# Patient Record
Sex: MALE | Race: BLACK OR AFRICAN AMERICAN | NOT HISPANIC OR LATINO | URBAN - METROPOLITAN AREA
[De-identification: names, ages, dates, MRNs, and addresses within clinical notes are randomized per-mention and may not be internally consistent; named-entity substitution may affect disease eponyms.]

---

## 2017-05-13 ENCOUNTER — EMERGENCY (EMERGENCY)
Facility: HOSPITAL | Age: 37
LOS: 1 days | Discharge: HOME | End: 2017-05-13
Attending: EMERGENCY MEDICINE
Payer: MEDICAID

## 2017-05-13 VITALS
DIASTOLIC BLOOD PRESSURE: 69 MMHG | RESPIRATION RATE: 19 BRPM | HEIGHT: 67 IN | WEIGHT: 149.91 LBS | TEMPERATURE: 99 F | HEART RATE: 92 BPM | SYSTOLIC BLOOD PRESSURE: 138 MMHG

## 2017-05-13 DIAGNOSIS — F10.129 ALCOHOL ABUSE WITH INTOXICATION, UNSPECIFIED: ICD-10-CM

## 2017-05-13 PROCEDURE — 99285 EMERGENCY DEPT VISIT HI MDM: CPT | Mod: 25

## 2017-05-13 NOTE — ED PROVIDER NOTE - CONSTITUTIONAL, MLM
normal... Well appearing, well nourished, awake, alert, oriented to person, place, time/situation and in no apparent distress. ETOH on breath.

## 2017-05-13 NOTE — ED ADULT NURSE NOTE - OBJECTIVE STATEMENT
Patient brought in by ambulance, found in bus intoxicated. Patient very agitated and verbally abusive to staff.

## 2017-05-13 NOTE — ED PROVIDER NOTE - OBJECTIVE STATEMENT
36 year old male was brought to the ED after drinking alcohol. He admits to drinking alcohol and is upset that his phone was lost. He is cursing about his lost phone. He denies suicidal or homicidal ideation. He denies any trauma. He does not want to be here and is asking to leave and demanding his phone.

## 2017-08-18 ENCOUNTER — EMERGENCY (EMERGENCY)
Facility: HOSPITAL | Age: 37
LOS: 1 days | Discharge: HOME | End: 2017-08-18
Attending: EMERGENCY MEDICINE
Payer: MEDICAID

## 2017-08-18 VITALS
HEART RATE: 88 BPM | TEMPERATURE: 98 F | OXYGEN SATURATION: 99 % | SYSTOLIC BLOOD PRESSURE: 137 MMHG | RESPIRATION RATE: 18 BRPM | DIASTOLIC BLOOD PRESSURE: 85 MMHG

## 2017-08-18 VITALS
OXYGEN SATURATION: 98 % | WEIGHT: 162.92 LBS | TEMPERATURE: 99 F | RESPIRATION RATE: 16 BRPM | SYSTOLIC BLOOD PRESSURE: 118 MMHG | HEART RATE: 117 BPM | DIASTOLIC BLOOD PRESSURE: 64 MMHG

## 2017-08-18 DIAGNOSIS — R00.2 PALPITATIONS: ICD-10-CM

## 2017-08-18 DIAGNOSIS — F17.210 NICOTINE DEPENDENCE, CIGARETTES, UNCOMPLICATED: ICD-10-CM

## 2017-08-18 LAB
ALBUMIN SERPL ELPH-MCNC: 4 G/DL — SIGNIFICANT CHANGE UP (ref 3.5–5)
ALP SERPL-CCNC: 85 U/L — SIGNIFICANT CHANGE UP (ref 40–120)
ALT FLD-CCNC: 46 U/L DA — SIGNIFICANT CHANGE UP (ref 10–60)
ANION GAP SERPL CALC-SCNC: 10 MMOL/L — SIGNIFICANT CHANGE UP (ref 5–17)
AST SERPL-CCNC: 44 U/L — HIGH (ref 10–40)
BASOPHILS # BLD AUTO: 0.1 K/UL — SIGNIFICANT CHANGE UP (ref 0–0.2)
BASOPHILS NFR BLD AUTO: 1.1 % — SIGNIFICANT CHANGE UP (ref 0–2)
BILIRUB SERPL-MCNC: 0.2 MG/DL — SIGNIFICANT CHANGE UP (ref 0.2–1.2)
BUN SERPL-MCNC: 21 MG/DL — HIGH (ref 7–18)
CALCIUM SERPL-MCNC: 8.9 MG/DL — SIGNIFICANT CHANGE UP (ref 8.4–10.5)
CHLORIDE SERPL-SCNC: 108 MMOL/L — SIGNIFICANT CHANGE UP (ref 96–108)
CO2 SERPL-SCNC: 25 MMOL/L — SIGNIFICANT CHANGE UP (ref 22–31)
CREAT SERPL-MCNC: 1.27 MG/DL — SIGNIFICANT CHANGE UP (ref 0.5–1.3)
EOSINOPHIL # BLD AUTO: 0 K/UL — SIGNIFICANT CHANGE UP (ref 0–0.5)
EOSINOPHIL NFR BLD AUTO: 0.2 % — SIGNIFICANT CHANGE UP (ref 0–6)
GLUCOSE SERPL-MCNC: 107 MG/DL — HIGH (ref 70–99)
HCT VFR BLD CALC: 44.1 % — SIGNIFICANT CHANGE UP (ref 39–50)
HGB BLD-MCNC: 14.3 G/DL — SIGNIFICANT CHANGE UP (ref 13–17)
LYMPHOCYTES # BLD AUTO: 2.1 K/UL — SIGNIFICANT CHANGE UP (ref 1–3.3)
LYMPHOCYTES # BLD AUTO: 20.3 % — SIGNIFICANT CHANGE UP (ref 13–44)
MCHC RBC-ENTMCNC: 32.3 GM/DL — SIGNIFICANT CHANGE UP (ref 32–36)
MCHC RBC-ENTMCNC: 33.8 PG — SIGNIFICANT CHANGE UP (ref 27–34)
MCV RBC AUTO: 104.8 FL — HIGH (ref 80–100)
MONOCYTES # BLD AUTO: 0.8 K/UL — SIGNIFICANT CHANGE UP (ref 0–0.9)
MONOCYTES NFR BLD AUTO: 7.4 % — SIGNIFICANT CHANGE UP (ref 2–14)
NEUTROPHILS # BLD AUTO: 7.2 K/UL — SIGNIFICANT CHANGE UP (ref 1.8–7.4)
NEUTROPHILS NFR BLD AUTO: 71 % — SIGNIFICANT CHANGE UP (ref 43–77)
PLATELET # BLD AUTO: 151 K/UL — SIGNIFICANT CHANGE UP (ref 150–400)
POTASSIUM SERPL-MCNC: 4 MMOL/L — SIGNIFICANT CHANGE UP (ref 3.5–5.3)
POTASSIUM SERPL-SCNC: 4 MMOL/L — SIGNIFICANT CHANGE UP (ref 3.5–5.3)
PROT SERPL-MCNC: 8 G/DL — SIGNIFICANT CHANGE UP (ref 6–8.3)
RBC # BLD: 4.21 M/UL — SIGNIFICANT CHANGE UP (ref 4.2–5.8)
RBC # FLD: 12.1 % — SIGNIFICANT CHANGE UP (ref 10.3–14.5)
SODIUM SERPL-SCNC: 143 MMOL/L — SIGNIFICANT CHANGE UP (ref 135–145)
TROPONIN I SERPL-MCNC: <0.015 NG/ML — SIGNIFICANT CHANGE UP (ref 0–0.04)
WBC # BLD: 10.2 K/UL — SIGNIFICANT CHANGE UP (ref 3.8–10.5)
WBC # FLD AUTO: 10.2 K/UL — SIGNIFICANT CHANGE UP (ref 3.8–10.5)

## 2017-08-18 PROCEDURE — 84484 ASSAY OF TROPONIN QUANT: CPT

## 2017-08-18 PROCEDURE — 71046 X-RAY EXAM CHEST 2 VIEWS: CPT

## 2017-08-18 PROCEDURE — 99283 EMERGENCY DEPT VISIT LOW MDM: CPT | Mod: 25

## 2017-08-18 PROCEDURE — 71020: CPT | Mod: 26

## 2017-08-18 PROCEDURE — 93005 ELECTROCARDIOGRAM TRACING: CPT

## 2017-08-18 PROCEDURE — 85027 COMPLETE CBC AUTOMATED: CPT

## 2017-08-18 PROCEDURE — 80053 COMPREHEN METABOLIC PANEL: CPT

## 2017-08-18 PROCEDURE — 36000 PLACE NEEDLE IN VEIN: CPT

## 2017-08-18 PROCEDURE — 99285 EMERGENCY DEPT VISIT HI MDM: CPT | Mod: 25

## 2017-08-18 RX ORDER — SODIUM CHLORIDE 9 MG/ML
3 INJECTION INTRAMUSCULAR; INTRAVENOUS; SUBCUTANEOUS ONCE
Qty: 0 | Refills: 0 | Status: COMPLETED | OUTPATIENT
Start: 2017-08-18 | End: 2017-08-18

## 2017-08-18 RX ADMIN — SODIUM CHLORIDE 3 MILLILITER(S): 9 INJECTION INTRAMUSCULAR; INTRAVENOUS; SUBCUTANEOUS at 04:58

## 2017-08-18 NOTE — ED PROVIDER NOTE - OBJECTIVE STATEMENT
37yo M 35yo M presents with palpitations. Onset at 11pm, reports minimal chest pain, denies dyspnea, leg swelling. Denies recent travel, fever. Reports mild nonproductive cough.

## 2019-01-06 ENCOUNTER — HOSPITAL ENCOUNTER (INPATIENT)
Dept: HOSPITAL 74 - YASAS | Age: 39
LOS: 1 days | Discharge: LEFT BEFORE DISCHARGE | DRG: 770 | End: 2019-01-07
Attending: INTERNAL MEDICINE | Admitting: INTERNAL MEDICINE
Payer: COMMERCIAL

## 2019-01-06 VITALS — BODY MASS INDEX: 20.2 KG/M2

## 2019-01-06 DIAGNOSIS — R55: ICD-10-CM

## 2019-01-06 DIAGNOSIS — F10.230: Primary | ICD-10-CM

## 2019-01-06 DIAGNOSIS — F17.210: ICD-10-CM

## 2019-01-06 DIAGNOSIS — F14.20: ICD-10-CM

## 2019-01-06 DIAGNOSIS — Z86.79: ICD-10-CM

## 2019-01-06 DIAGNOSIS — F12.20: ICD-10-CM

## 2019-01-06 PROCEDURE — HZ2ZZZZ DETOXIFICATION SERVICES FOR SUBSTANCE ABUSE TREATMENT: ICD-10-PCS | Performed by: INTERNAL MEDICINE

## 2019-01-06 NOTE — HP
CIWA Score


Nausea/Vomitin


Muscle Tremors: 2


Anxiety: 2


Agitation: 2


Paroxysmal Sweats: 1-Minimal Palms Moist


Orientation: 0-Oriented


Tacttile Disturbances: 1-Very Mild Itch/Numbness


Auditory Disturbances: 1-Very Mild


Visual Disturbances: 0-None


Headache: 2-Mild


CIWA-Ar Total Score: 13





- Admission Criteria


[facility] Guidelines: Admission for Medically Managed Detox: 


Requires at least one of the followin. CIWA greater than 12


2. Seizures within the past 24 hours


3. Delirium tremens within the past 24 hours


4. Hallucinations within the past 24 hours


5. Acute intervention needed for co  occurring medical disorder


6. Acute intervention needed for co  occurring psychiatric disorder


7. Severe withdrawal that cannot be handled at a lower level of care (continued


    vomiting, continued diarrhea, abnormal vital signs) requiring intravenous


    medication and/or fluids


8. Pregnancy





Patient presents the following: CIWA greater than 12


Admission Criteria Met: Admission criteria met





Admission ROS S





- [facility]


Chief Complaint: 





i need help to stop drinking alcohol,marijuana,cocaine dependence


Allergies/Adverse Reactions: 


 Allergies











Allergy/AdvReac Type Severity Reaction Status Date / Time


 


No Known Allergies Allergy   Verified 19 18:48











History of Present Illness: 





this 38 years old male with alcohol and marijuana dependence,cocaine dependence,

seeking detox,never been in detox before


syncope


nicotine dependence


no significant period of sobriety


history of wpw,irregularity of heart beat,since  ,supposed to take asa 325 

mgs po daily,non compliance,had cardiologist in [place]


Exam Limitations: No Limitations





- Ebola screening


Have you traveled outside of the country in the last 21 days: No (N)


Have you had contact with anyone from an Ebola affected area: No


Have you been sick,other than usual withdrawal symptoms: No


Do you have a fever: No





- Review of Systems


Constitutional: Loss of Appetite, Malaise, Night Sweats, Changes in sleep, 

Weakness


EENT: reports: Nose Congestion


Respiratory: reports: No Symptoms reported


Cardiac: reports: No Symptoms Reported


GI: reports: Nausea, Poor Appetite, Abdominal cramping


: reports: No Symptoms Reported


Musculoskeletal: reports: Back Pain, Muscle Pain


Integumentary: reports: Dryness


Neuro: reports: Headache, Tremors


Endocrine: reports: No Symptoms Reported


Hematology: reports: No Symptoms Reported


Psychiatric: reports: No Sypmtoms Reported, Judgement Intact, Mood/Affect 

Appropiate, Orientated x3


Other Systems: Reviewed and Negative





Patient History





- Patient Medical History


Hx Anemia: No


Hx Asthma: No


Hx Chronic Obstructive Pulmonary Disease (COPD): No


Hx Cancer: No


Hx Cardiac Disorders: No


Hx Congestive Heart Failure: No


Hx Hypertension: No


Hx Hypercholesterolemia: No


Hx Pacemaker: No


HX Cerebrovascular Accident: No


Hx Seizures: No


Hx Dementia: No


Hx Diabetes: No


Hx Gastrointestinal Disorders: No


Hx Sexually Transmitted Disorders: No


Hx Renal Disease (ESRD): No


Hx Thyroid Disease: No


Hx Human Immunodeficiency Virus (HIV): No (last hiv test  negative)


Hx Hepatitis C: No


Hx Depression: No


Hx Suicide Attempt: No


Hx Bipolar Disorder: No


Hx Schizophrenia: No


Other Medical History: no suicidal,no homicidal





- Patient Surgical History


Past Surgical History: No





- PPD History


Previous Implant?: Yes


Documented Results: Negative w/o proof


Implanted On Prior [facility] Admission?: No


PPD to be Administered?: Yes





- Smoking Cessation


Smoking history: Current every day smoker


Have you smoked in the past 12 months: Yes


Aproximately how many cigarettes per day: 5


Cigars Per Day: 0


Hx Chewing Tobacco Use: No


Initiated information on smoking cessation: Yes


'Breaking Loose' booklet given: 19





- Substance & Tx. History


Hx Alcohol Use: Yes


Hx Substance Use: Yes


Substance Use Type: Alcohol, Cocaine, Marijuana


Hx Substance Use Treatment: No





- Substances Abused


  ** Alcohol


Route: Oral


Frequency: Daily


Amount used: LIQUOR- 4 PINTS


Age of first use: 36


Date of Last Use: 19





  ** Marijuana/Hashish


Route: Smoking


Frequency: Daily


Amount used: 7 JOINTS


Age of first use: 19


Date of Last Use: 19





  ** Cocaine


Route: Inhalation


Frequency: 1-3 times last 30 days


Amount used: 20$


Age of first use: 38


Date of Last Use: 19





Family Disease History





- Family Disease History


Family History: Denies





Admission Physical Exam [facility]





- Vital Signs


Vital Signs: 


 Vital Signs - 24 hr











  19





  17:35


 


Temperature 98.1 F


 


Pulse Rate 86


 


Respiratory 18





Rate 


 


Blood Pressure 143/86














- Physical


General Appearance: Yes: Moderate Distress, Tremorous, Irritable, Sweating, 

Anxious


HEENTM: Yes: Normal ENT Inspection, SIS, Pharynx Normal


Respiratory: Yes: Lungs Clear, Normal Breath Sounds, No Respiratory Distress


Neck: Yes: Within Normal Limits, Supple, Trachea in good position


Breast: Yes: Within Normal Limits


Cardiology: Yes: Within Normal Limits, Regular Rhythm, Regular Rate, S1, S2


Abdominal: Yes: Within Normal Limits, Normal Bowel Sounds, Non Tender, Flat, 

Soft


Genitourinary: Yes: Within Normal Limits


Back: Yes: Within Normal Limits, Normal Inspection, Muscle Spasm


Musculoskeletal: Yes: Back pain, Muscle Pain


Extremities: Yes: Within Normal Limits, Tremors


Neurological: Yes: CNs II-XII NML intact, Fully Oriented, Alert, Motor Strength 

5/5


Integumentary: Yes: Dry


Lymphatic: Yes: Within Normal Limits





- Diagnostic


(1) Alcohol dependence with uncomplicated withdrawal


Current Visit: Yes   Status: Acute   





(2) Cannabis dependence


Current Visit: Yes   Status: Acute   





(3) Cocaine abuse


Current Visit: Yes   Status: Acute   





(4) Syncope


Current Visit: Yes   Status: Acute   





(5) Nicotine dependence


Current Visit: Yes   Status: Acute   





(6) History of Francesco-Parkinson-White (WPW) syndrome


Current Visit: Yes   Status: Acute   





Cleared for Admission [facility]





- Detox or Rehab


[facility] Level of Care: Medically Managed


Detox Regimen/Protocol: Librium





[facility] Breath Alcohol Content


Breath Alcohol Content: 0.240





Urine Drug Screen





- Results


Drug Screen Negative: No


Urine Drug Screen Results: THC-Marijuana, TARUN-Cocaine

## 2019-01-07 VITALS — HEART RATE: 77 BPM

## 2019-01-07 VITALS — DIASTOLIC BLOOD PRESSURE: 64 MMHG | SYSTOLIC BLOOD PRESSURE: 151 MMHG | TEMPERATURE: 98.1 F

## 2019-01-07 LAB
ALBUMIN SERPL-MCNC: 3.6 G/DL (ref 3.4–5)
ALP SERPL-CCNC: 90 U/L (ref 45–117)
ALT SERPL-CCNC: 31 U/L (ref 13–61)
ANION GAP SERPL CALC-SCNC: 9 MMOL/L (ref 8–16)
AST SERPL-CCNC: 47 U/L (ref 15–37)
BILIRUB SERPL-MCNC: 1.1 MG/DL (ref 0.2–1)
BUN SERPL-MCNC: 13 MG/DL (ref 7–18)
CALCIUM SERPL-MCNC: 8.6 MG/DL (ref 8.5–10.1)
CHLORIDE SERPL-SCNC: 100 MMOL/L (ref 98–107)
CO2 SERPL-SCNC: 29 MMOL/L (ref 21–32)
CREAT SERPL-MCNC: 1.2 MG/DL (ref 0.55–1.3)
DEPRECATED RDW RBC AUTO: 13.1 % (ref 11.9–15.9)
GLUCOSE SERPL-MCNC: 123 MG/DL (ref 74–106)
HCT VFR BLD CALC: 37.6 % (ref 35.4–49)
HGB BLD-MCNC: 13.3 GM/DL (ref 11.7–16.9)
MCH RBC QN AUTO: 36.1 PG (ref 25.7–33.7)
MCHC RBC AUTO-ENTMCNC: 35.5 G/DL (ref 32–35.9)
MCV RBC: 101.7 FL (ref 80–96)
PLATELET # BLD AUTO: 159 K/MM3 (ref 134–434)
PMV BLD: 8.3 FL (ref 7.5–11.1)
POTASSIUM SERPLBLD-SCNC: 3.8 MMOL/L (ref 3.5–5.1)
PROT SERPL-MCNC: 7 G/DL (ref 6.4–8.2)
RBC # BLD AUTO: 3.69 M/MM3 (ref 4–5.6)
SODIUM SERPL-SCNC: 137 MMOL/L (ref 136–145)
WBC # BLD AUTO: 4.5 K/MM3 (ref 4–10)

## 2019-01-07 NOTE — DS
[facility] Detox Discharge Summary


Admission Date: 


01/06/19





Discharge Date: 01/07/19





- History


Present History: Alcohol Dependence, Cannabis Dependence, Cocaine Dependence


Additional Comments: 





PATIENT DOES NOT WISH TO REMAIN TO COMPLETE DETOX REGIMEN. RISKS OF LEAVING 

DETOX UNIT AGAINST MEDICAL ADVICE AND PRIOR TO COMPLETION OF DETOX REGIMEN 

EXPLAINED TO PATIENT. PATIENT ADVISED TO GO IMMEDIATELY TO NEAREST ER SHOULD 

ANY INTOLERABLE DETOX SYMPTOMS DEVELOP AT ANY TIME. PATIENT VERBALIZED 

UNDERSTANDING OF ALL INFORMATION / RECOMMENDATIONS PRESENTED TO HIM PRIOR TO 

DEPARTURE FROM DETOX UNIT. PATIENT LEFT DETOX UNIT IN STABLE MEDICAL CONDITION.


Pertinent Past History: 





History of Francesco-Parkinson-White (WPW) Syndrome, Nicotine Dependence, History 

of Syncope.





- Physical Exam Results


Vital Signs: 


 Vital Signs











Temperature  98.1 F   01/07/19 10:21


 


Pulse Rate  77   01/07/19 10:21


 


Respiratory Rate  18   01/07/19 10:21


 


Blood Pressure  151/64   01/07/19 10:21


 


O2 Sat by Pulse Oximetry (%)      











Pertinent Admission Physical Exam Findings: 





WITHDRAWAL SYMPTOMS.





 Laboratory Tests











  01/07/19 01/07/19 01/07/19





  07:00 07:00 07:00


 


WBC   4.5 


 


RBC   3.69 L 


 


Hgb   13.3 


 


Hct   37.6 


 


MCV   101.7 H 


 


MCH   36.1 H 


 


MCHC   35.5 


 


RDW   13.1 


 


Plt Count   159 


 


MPV   8.3 


 


Sodium    137


 


Potassium    3.8


 


Chloride    100


 


Carbon Dioxide    29


 


Anion Gap    9


 


BUN    13


 


Creatinine    1.2


 


Creat Clearance w eGFR    > 60


 


Random Glucose    123 H


 


Calcium    8.6


 


Total Bilirubin    1.1 H


 


AST    47 H


 


ALT    31


 


Alkaline Phosphatase    90


 


Total Protein    7.0


 


Albumin    3.6


 


RPR Titer   


 


HIV 1&2 Antibody Screen  Negative  


 


HIV P24 Antigen  Negative  














  01/07/19





  07:00


 


WBC 


 


RBC 


 


Hgb 


 


Hct 


 


MCV 


 


MCH 


 


MCHC 


 


RDW 


 


Plt Count 


 


MPV 


 


Sodium 


 


Potassium 


 


Chloride 


 


Carbon Dioxide 


 


Anion Gap 


 


BUN 


 


Creatinine 


 


Creat Clearance w eGFR 


 


Random Glucose 


 


Calcium 


 


Total Bilirubin 


 


AST 


 


ALT 


 


Alkaline Phosphatase 


 


Total Protein 


 


Albumin 


 


RPR Titer  Nonreactive


 


HIV 1&2 Antibody Screen 


 


HIV P24 Antigen 








LABS NOTED.





- Treatment


Hospital Course: Detoxed Safely





- Medication


Discharge Medications: 


Ambulatory Orders





Aspirin [ASA -] 325 mg PO DAILY 01/06/19 











- Diagnosis


(1) Alcohol dependence with uncomplicated withdrawal


Current Visit: Yes   Status: Acute   





(2) Cannabis dependence


Current Visit: Yes   Status: Acute   





(3) Cocaine abuse


Current Visit: Yes   Status: Acute   





(4) History of Francesco-Parkinson-White (WPW) syndrome


Current Visit: Yes   Status: Chronic   





(5) Nicotine dependence


Current Visit: Yes   Status: Chronic   


Qualifiers: 


   Nicotine product type: cigarettes   Substance use status: uncomplicated   

Qualified Code(s): F17.210 - Nicotine dependence, cigarettes, uncomplicated   





(6) Syncope


Current Visit: Yes   Status: Acute   


Qualifiers: 


   Syncope type: unspecified   Qualified Code(s): R55 - Syncope and collapse   





- AMA


Did Patient Leave Against Medical Advice: Yes (PATIENT DID NOT WISH TO REMAIN 

TO COMPLETE DETOX REGIMEN.)

## 2019-01-07 NOTE — PN
S CIWA





- CIWA Score


Nausea/Vomiting: 3


Muscle Tremors: 3


Anxiety: 4-Mod. Anxious/Guarded


Agitation: 3


Paroxysmal Sweats: No Perspiration


Orientation: 0-Oriented


Tacttile Disturbances: 2-Mild Itch/Numbness/Burn


Auditory Disturbances: 0-None


Visual Disturbances: 1-Very Mild Sensitivity


Headache: 0-None Present


CIWA-Ar Total Score: 16





[facility] Progress Note (SOAP)


Subjective: 





Tremors, Anxious, Nausea, Body Aches.


Objective: 


PATIENT A & O X 3, OBSERVED AMBULATING ON UNIT. IN NO ACUTE DISTRESS.





01/07/19 12:52


 Vital Signs











Temperature  98.1 F   01/07/19 10:21


 


Pulse Rate  77   01/07/19 10:21


 


Respiratory Rate  18   01/07/19 10:21


 


Blood Pressure  151/64   01/07/19 10:21


 


O2 Sat by Pulse Oximetry (%)      








 Laboratory Tests











  01/07/19 01/07/19 01/07/19





  07:00 07:00 07:00


 


WBC   4.5 


 


RBC   3.69 L 


 


Hgb   13.3 


 


Hct   37.6 


 


MCV   101.7 H 


 


MCH   36.1 H 


 


MCHC   35.5 


 


RDW   13.1 


 


Plt Count   159 


 


MPV   8.3 


 


Sodium    137


 


Potassium    3.8


 


Chloride    100


 


Carbon Dioxide    29


 


Anion Gap    9


 


BUN    13


 


Creatinine    1.2


 


Creat Clearance w eGFR    > 60


 


Random Glucose    123 H


 


Calcium    8.6


 


Total Bilirubin    1.1 H


 


AST    47 H


 


ALT    31


 


Alkaline Phosphatase    90


 


Total Protein    7.0


 


Albumin    3.6


 


RPR Titer   


 


HIV 1&2 Antibody Screen  Negative  


 


HIV P24 Antigen  Negative  














  01/07/19





  07:00


 


WBC 


 


RBC 


 


Hgb 


 


Hct 


 


MCV 


 


MCH 


 


MCHC 


 


RDW 


 


Plt Count 


 


MPV 


 


Sodium 


 


Potassium 


 


Chloride 


 


Carbon Dioxide 


 


Anion Gap 


 


BUN 


 


Creatinine 


 


Creat Clearance w eGFR 


 


Random Glucose 


 


Calcium 


 


Total Bilirubin 


 


AST 


 


ALT 


 


Alkaline Phosphatase 


 


Total Protein 


 


Albumin 


 


RPR Titer  Nonreactive


 


HIV 1&2 Antibody Screen 


 


HIV P24 Antigen 








LABS NOTED.


Assessment: 





01/07/19 12:52


WITHDRAWAL SYMPTOMS.


Plan: 





CONTINUE DETOX.

## 2019-01-07 NOTE — EKG
Test Reason : 

Blood Pressure : ***/*** mmHG

Vent. Rate : 102 BPM     Atrial Rate : 102 BPM

   P-R Int : 130 ms          QRS Dur : 084 ms

    QT Int : 372 ms       P-R-T Axes : 078 073 058 degrees

   QTc Int : 484 ms

 

SINUS TACHYCARDIA

OTHERWISE NORMAL ECG

NO PREVIOUS ECGS AVAILABLE

Confirmed by GUS ROBERTS MD ([id number]) on 1/7/2019 9:45:08 AM

 

Referred By:             Confirmed By:GUS ROBERTS MD

## 2019-03-02 ENCOUNTER — HOSPITAL ENCOUNTER (INPATIENT)
Dept: HOSPITAL 74 - YASAS | Age: 39
LOS: 1 days | Discharge: LEFT BEFORE DISCHARGE | DRG: 770 | End: 2019-03-03
Attending: SURGERY | Admitting: SURGERY
Payer: COMMERCIAL

## 2019-03-02 VITALS — BODY MASS INDEX: 22.6 KG/M2

## 2019-03-02 DIAGNOSIS — E11.9: ICD-10-CM

## 2019-03-02 DIAGNOSIS — F17.210: ICD-10-CM

## 2019-03-02 DIAGNOSIS — F10.230: Primary | ICD-10-CM

## 2019-03-02 DIAGNOSIS — F12.20: ICD-10-CM

## 2019-03-02 DIAGNOSIS — I45.6: ICD-10-CM

## 2019-03-02 DIAGNOSIS — R00.0: ICD-10-CM

## 2019-03-02 PROCEDURE — HZ2ZZZZ DETOXIFICATION SERVICES FOR SUBSTANCE ABUSE TREATMENT: ICD-10-PCS | Performed by: SURGERY

## 2019-03-02 NOTE — HP
CIWA Score


Nausea/Vomiting: 3


Muscle Tremors: 4-Moderate,w/Arms Extend


Anxiety: 3


Agitation: 0-Normal Activity


Paroxysmal Sweats: 3


Orientation: 0-Oriented


Tacttile Disturbances: 0-None


Auditory Disturbances: 0-None


Visual Disturbances: 0-None


Headache: 3-Moderate


CIWA-Ar Total Score: 16





- Admission Criteria


[facility] Guidelines: Admission for Medically Managed Detox: 


Requires at least one of the followin. CIWA greater than 12


2. Seizures within the past 24 hours


3. Delirium tremens within the past 24 hours


4. Hallucinations within the past 24 hours


5. Acute intervention needed for co  occurring medical disorder


6. Acute intervention needed for co  occurring psychiatric disorder


7. Severe withdrawal that cannot be handled at a lower level of care (continued


    vomiting, continued diarrhea, abnormal vital signs) requiring intravenous


    medication and/or fluids


8. Pregnancy








Admission ROS [facility]





- [facility]


Chief Complaint: 





Alcohol withdrawal  symptoms


Allergies/Adverse Reactions: 


 Allergies











Allergy/AdvReac Type Severity Reaction Status Date / Time


 


No Known Allergies Allergy   Verified 19 18:59











History of Present Illness: 





38 years old male with a long history of alcohol dependence is seeking 

admission to detox. Patient has been in previous detox and reports 

insignificant period of sobriety. He has medical history of  Francesco Parkinson 

White Syndrome  (WPW) and Diabetes Type 2. He denies suicide attempt and 

suicidal ideation at this time.


Exam Limitations: No Limitations





- Ebola screening


Have you traveled outside of the country in the last 21 days: No (N)


Have you had contact with anyone from an Ebola affected area: No


Have you been sick,other than usual withdrawal symptoms: No


Do you have a fever: No





- Review of Systems


Constitutional: Chills, Loss of Appetite, Night Sweats, Changes in sleep


EENT: reports: No Symptoms Reported


Respiratory: reports: No Symptoms reported


Cardiac: reports: No Symptoms Reported


GI: reports: Nausea, Poor Appetite, Poor Fluid Intake, Vomiting ( x 4)


: reports: No Symptoms Reported


Musculoskeletal: reports: No Symptoms Reported


Integumentary: reports: Dryness, Flushing


Neuro: reports: Headache, Tremors


Endocrine: reports: No Symptoms Reported


Hematology: reports: No Symptoms Reported


Psychiatric: reports: Mood/Affect Appropiate, Orientated x3


Other Systems: Reviewed and Negative





Patient History





- Patient Medical History


Hx Anemia: No


Hx Asthma: No


Hx Chronic Obstructive Pulmonary Disease (COPD): No


Hx Cancer: No


Hx Cardiac Disorders: Yes (Francesco parkinson Syndrome)


Hx Congestive Heart Failure: No


Hx Hypertension: No


Hx Hypercholesterolemia: No


Hx Pacemaker: No


HX Cerebrovascular Accident: No


Hx Seizures: No


Hx Dementia: No


Hx Diabetes: No


Hx Gastrointestinal Disorders: No


Hx Genitourinary Disorders: No


Hx Sexually Transmitted Disorders: No


Hx Renal Disease (ESRD): No


Hx Thyroid Disease: No


Hx Human Immunodeficiency Virus (HIV): No (Negative 2019)


Hx Hepatitis C: No


Hx Depression: No


Hx Suicide Attempt: No (Denies suicidal ideation at this time)


Hx Bipolar Disorder: No


Hx Schizophrenia: No





- Patient Surgical History


Past Surgical History: No


Hx Neurologic Surgery: No


Hx Cataract Extraction: No


Hx Cardiac Surgery: No


Hx Lung Surgery: No


Hx Abdominal Surgery: No


Hx Appendectomy: No


Hx Cholecystectomy: No


Hx Genitourinary Surgery: No


Hx Orthopedic Surgery: No


Anesthesia Reaction: No





- PPD History


Previous Implant?: Yes


Date: 19


Results: AMA


PPD to be Administered?: Yes





- Reproductive History


Patient is a Female of Child Bearing Age (11 -55 yrs old): No (MALE)





- Smoking Cessation


Smoking history: Current every day smoker


Have you smoked in the past 12 months: Yes


Aproximately how many cigarettes per day: 5


Cigars Per Day: 0


Hx Chewing Tobacco Use: No


Initiated information on smoking cessation: Yes


'Breaking Loose' booklet given: 19





- Substance & Tx. History


Hx Alcohol Use: Yes


Hx Substance Use: Yes


Substance Use Type: Alcohol, Marijuana


Hx Substance Use Treatment: Yes ([facility])





- Substances Abused


  ** Alcohol


Route: Oral


Frequency: Daily


Amount used: LIQUOR- 3 PINTS


Age of first use: 30


Date of Last Use: 19





  ** Marijuana/Hashish


Frequency: Daily


Amount used: $10


Age of first use: 22


Date of Last Use: 19





Family Disease History





- Family Disease History


Family History: Denies





Admission Physical Exam [facility]





- Vital Signs


Vital Signs: 


 Vital Signs - 24 hr











  19





  18:50


 


Temperature 98.6 F


 


Pulse Rate 113 H


 


Respiratory 18





Rate 


 


Blood Pressure 147/84














- Physical


General Appearance: Yes: Moderate Distress, Alcohol on Breath, Tremorous, 

Sweating, Anxious


HEENTM: Yes: Normal ENT Inspection, Normocephalic, Normal Voice, SIS


Respiratory: Yes: Normal Breath Sounds, No Respiratory Distress


Breast: Yes: Breast Exam Deferred


Cardiology: Yes: Tachycardia, Irregular


Abdominal: Yes: Normal Bowel Sounds


Genitourinary: Yes: Within Normal Limits


Back: Yes: Normal Inspection


Musculoskeletal: Yes: Within Normal Limits


Extremities: Yes: Tremors


Neurological: Yes: Alert, Normal Mood/Affect


Integumentary: Yes: Within Normal Limits


Lymphatic: Yes: Within Normal Limits





- Diagnostic


(1) Alcohol dependence with uncomplicated withdrawal


Current Visit: No   Status: Acute   





(2) Cannabis dependence


Current Visit: No   Status: Acute   





(3) History of Francesco-Parkinson-White (WPW) syndrome


Current Visit: Yes   Status: Chronic   





(4) Nicotine dependence


Current Visit: Yes   Status: Chronic   


Qualifiers: 


   Nicotine product type: cigarettes   Substance use status: uncomplicated   

Qualified Code(s): F17.210 - Nicotine dependence, cigarettes, uncomplicated   





Cleared for Admission [facility]





- Detox or Rehab


[facility] Level of Care: Medically Managed


Detox Regimen/Protocol: Librium





[facility] Breath Alcohol Content


Breath Alcohol Content: 0.196





Urine Drug Screen





- Results


Drug Screen Negative: No


Urine Drug Screen Results: THC-Marijuana





Inpatient Rehab Admission





- Rehab Decision to Admit


Inpatient rehab admission?: No

## 2019-03-03 VITALS — TEMPERATURE: 98.3 F | HEART RATE: 84 BPM | SYSTOLIC BLOOD PRESSURE: 128 MMHG | DIASTOLIC BLOOD PRESSURE: 65 MMHG

## 2019-03-03 LAB
ALBUMIN SERPL-MCNC: 3.5 G/DL (ref 3.4–5)
ALP SERPL-CCNC: 90 U/L (ref 45–117)
ALT SERPL-CCNC: 49 U/L (ref 13–61)
ANION GAP SERPL CALC-SCNC: 5 MMOL/L (ref 8–16)
AST SERPL-CCNC: 63 U/L (ref 15–37)
BILIRUB SERPL-MCNC: 0.7 MG/DL (ref 0.2–1)
BUN SERPL-MCNC: 11 MG/DL (ref 7–18)
CALCIUM SERPL-MCNC: 8 MG/DL (ref 8.5–10.1)
CHLORIDE SERPL-SCNC: 103 MMOL/L (ref 98–107)
CO2 SERPL-SCNC: 31 MMOL/L (ref 21–32)
CREAT SERPL-MCNC: 0.9 MG/DL (ref 0.55–1.3)
DEPRECATED RDW RBC AUTO: 15.2 % (ref 11.9–15.9)
GLUCOSE SERPL-MCNC: 82 MG/DL (ref 74–106)
HCT VFR BLD CALC: 38.4 % (ref 35.4–49)
HGB BLD-MCNC: 13.4 GM/DL (ref 11.7–16.9)
MCH RBC QN AUTO: 37 PG (ref 25.7–33.7)
MCHC RBC AUTO-ENTMCNC: 34.8 G/DL (ref 32–35.9)
MCV RBC: 106.2 FL (ref 80–96)
PLATELET # BLD AUTO: 149 K/MM3 (ref 134–434)
PMV BLD: 8.2 FL (ref 7.5–11.1)
POTASSIUM SERPLBLD-SCNC: 3.8 MMOL/L (ref 3.5–5.1)
PROT SERPL-MCNC: 6.9 G/DL (ref 6.4–8.2)
RBC # BLD AUTO: 3.62 M/MM3 (ref 4–5.6)
SODIUM SERPL-SCNC: 140 MMOL/L (ref 136–145)
WBC # BLD AUTO: 3.4 K/MM3 (ref 4–10)

## 2019-03-03 NOTE — DS
[facility] Detox Discharge Summary


Admission Date: 


03/02/19





Discharge Date: 03/03/19





- History


Present History: Alcohol Dependence


Additional Comments: 





Patient left AMA despite encouragement to complete detox. Patient stated, "I 

just want to leave." Patient refused to speak anymore with staff. Patient 

reported to writer this morning that he was experiencing withdrawal symptoms: 

sweating, chills, tremor. Patient instructed to call 911 ASAP if feeling sick 

or withdrawal symptoms and to follow up with his PCP within 3 days in which he 

verbalized understanding. Patient is A/A/Ox3, in nad, vss, ambulatory. Patient 

left AMA in stable condition.


Pertinent Past History: 





Cannabis dependence





Alcohol dependence





WPW Syndrome





DMT2





Nicotine dependence





- Physical Exam Results


Vital Signs: 


 Vital Signs











Temperature  98.3 F   03/03/19 10:00


 


Pulse Rate  84   03/03/19 10:00


 


Respiratory Rate  18   03/03/19 10:00


 


Blood Pressure  128/65   03/03/19 10:00


 


O2 Sat by Pulse Oximetry (%)      











Pertinent Admission Physical Exam Findings: 





Withdrawal symptoms





 Laboratory Tests











  03/03/19 03/03/19 03/03/19





  05:54 07:35 07:35


 


WBC   3.4 L 


 


RBC   3.62 L 


 


Hgb   13.4 


 


Hct   38.4 


 


MCV   106.2 H 


 


MCH   37.0 H 


 


MCHC   34.8 


 


RDW   15.2  D 


 


Plt Count   149 


 


MPV   8.2 


 


Sodium    140


 


Potassium    3.8


 


Chloride    103


 


Carbon Dioxide    31


 


Anion Gap    5 L


 


BUN    11


 


Creatinine    0.9


 


Creat Clearance w eGFR    > 60


 


POC Glucometer  95  


 


Random Glucose    82


 


Calcium    8.0 L


 


Total Bilirubin    0.7


 


AST    63 H


 


ALT    49


 


Alkaline Phosphatase    90


 


Total Protein    6.9


 


Albumin    3.5


 


RPR Titer   














  03/03/19





  07:35


 


WBC 


 


RBC 


 


Hgb 


 


Hct 


 


MCV 


 


MCH 


 


MCHC 


 


RDW 


 


Plt Count 


 


MPV 


 


Sodium 


 


Potassium 


 


Chloride 


 


Carbon Dioxide 


 


Anion Gap 


 


BUN 


 


Creatinine 


 


Creat Clearance w eGFR 


 


POC Glucometer 


 


Random Glucose 


 


Calcium 


 


Total Bilirubin 


 


AST 


 


ALT 


 


Alkaline Phosphatase 


 


Total Protein 


 


Albumin 


 


RPR Titer  Nonreactive








Labs reviewed





- Medication


Discharge Medications: 


Ambulatory Orders





Aspirin [ASA -] 325 mg PO DAILY 01/06/19 


Insulin Lispro [Humalog] 0 unit SQ BID 03/02/19 











- Diagnosis


(1) Diabetes mellitus


Status: Chronic   





(2) Alcohol dependence with uncomplicated withdrawal


Status: Acute   





(3) Cannabis dependence


Status: Chronic   





(4) History of Francesco-Parkinson-White (WPW) syndrome


Status: Chronic   





(5) Nicotine dependence


Status: Chronic   


Qualifiers: 


   Nicotine product type: cigarettes   Substance use status: uncomplicated   

Qualified Code(s): F17.210 - Nicotine dependence, cigarettes, uncomplicated   





- AMA


Did Patient Leave Against Medical Advice: Yes (Instructed to call 911 ASAP if 

feeling sick or withdrawal symptoms)

## 2019-03-04 NOTE — EKG
Test Reason : 

Blood Pressure : ***/*** mmHG

Vent. Rate : 115 BPM     Atrial Rate : 115 BPM

   P-R Int : 120 ms          QRS Dur : 086 ms

    QT Int : 326 ms       P-R-T Axes : 076 077 063 degrees

   QTc Int : 450 ms

 

SINUS TACHYCARDIA

OTHERWISE NORMAL ECG

WHEN COMPARED WITH ECG OF 06-JAN-2019 19:47,

NO SIGNIFICANT CHANGE WAS FOUND

Confirmed by GUS ROBERTS MD ([id number]) on 3/4/2019 11:07:58 AM

 

Referred By:             Confirmed By:GUS ROBERTS MD

## 2019-03-18 ENCOUNTER — HOSPITAL ENCOUNTER (INPATIENT)
Dept: HOSPITAL 74 - YASAS | Age: 39
LOS: 2 days | Discharge: HOME | End: 2019-03-20
Attending: SURGERY | Admitting: SURGERY
Payer: COMMERCIAL

## 2019-03-18 VITALS — BODY MASS INDEX: 22.4 KG/M2

## 2019-03-18 DIAGNOSIS — F14.20: ICD-10-CM

## 2019-03-18 DIAGNOSIS — F10.230: Primary | ICD-10-CM

## 2019-03-18 DIAGNOSIS — Z91.89: ICD-10-CM

## 2019-03-18 DIAGNOSIS — F19.24: ICD-10-CM

## 2019-03-18 DIAGNOSIS — I10: ICD-10-CM

## 2019-03-18 DIAGNOSIS — F12.20: ICD-10-CM

## 2019-03-18 DIAGNOSIS — I45.6: ICD-10-CM

## 2019-03-18 DIAGNOSIS — F17.213: ICD-10-CM

## 2019-03-18 NOTE — HP
CIWA Score


Nausea/Vomitin-Mild Nausea/No Vomiting


Muscle Tremors: None


Anxiety: 4-Mod. Anxious/Guarded


Agitation: 4-Moderately Restless


Paroxysmal Sweats: 3


Orientation: 0-Oriented


Tacttile Disturbances: 2-Mild Itch/Numbness/Burn


Auditory Disturbances: 0-None


Visual Disturbances: 0-None


Headache: 0-None Present


CIWA-Ar Total Score: 14





- Admission Criteria


OAS Guidelines: Admission for Medically Managed Detox: 


Requires at least one of the followin. CIWA greater than 12


2. Seizures within the past 24 hours


3. Delirium tremens within the past 24 hours


4. Hallucinations within the past 24 hours


5. Acute intervention needed for co  occurring medical disorder


6. Acute intervention needed for co  occurring psychiatric disorder


7. Severe withdrawal that cannot be handled at a lower level of care (continued


    vomiting, continued diarrhea, abnormal vital signs) requiring intravenous


    medication and/or fluids


8. Pregnancy





Patient presents the following: CIWA greater than 12


Admission Criteria Met: Admission criteria met





Admission ROS [facility]





- [facility]


Chief Complaint: 





C/O WORSENING WITHDRAWAL SX'S. SEEKING DETOX


Allergies/Adverse Reactions: 


 Allergies











Allergy/AdvReac Type Severity Reaction Status Date / Time


 


No Known Allergies Allergy   Verified 19 18:59











History of Present Illness: 





38 Y.O. MALE WITH ALCOHOLISM HERE FOR DETOX. CLIENT IS KNOWN TO THIS PROGRAM. 

AMA 2 WEEKS AG O BECAUSE " I WAS NOT READY". PRESENTS TODAY WITH C/O WORSENING 

WITHDRAWAL SX'S CIWA 14. CLIENT DENIES MEDICAL AND PSYCH HX. BUT DOES ADMIT TO 

AGITATION, FEELING OF DEPRESSION BUT GISSELL SI/HI AND ELEVATED BP'S ASSOCIATED 

WITH WITHDRAWAl sx's. HE REPORTS DRINKING ABOUT 3 PINTS DAILY. LAST DRINK 

EALRIER TODAY. DENIES ANY SIGNIFICANT PERIOD OF CLEAN TIME DUE TO WITHDRAWALS 

AND CRAVINGS.  UTOX + THC, BZO, SALMA 0.113. LIVES WITH FAMILY, EMPLOYED, DENIES 

LEGALS.


Exam Limitations: No Limitations





- Ebola screening


Have you traveled outside of the country in the last 21 days: No (N)


Have you had contact with anyone from an Ebola affected area: No


Have you been sick,other than usual withdrawal symptoms: No


Do you have a fever: No





- Review of Systems


Constitutional: Chills, Loss of Appetite, Malaise, Night Sweats, Changes in 

sleep, Unintentional Wgt. Loss


EENT: reports: Nose Congestion


Respiratory: reports: No Symptoms reported


Cardiac: reports: No Symptoms Reported


GI: reports: Nausea, Vomiting


: reports: No Symptoms Reported


Musculoskeletal: reports: Back Pain


Integumentary: reports: Sweating


Neuro: reports: Tremors


Endocrine: reports: No Symptoms Reported


Hematology: reports: No Symptoms Reported


Psychiatric: reports: Orientated x3, Agitated (IRRITABLE), Anxious, Depressed


Other Systems: Reviewed and Negative





Patient History





- Patient Medical History


Hx Anemia: No


Hx Asthma: No


Hx Chronic Obstructive Pulmonary Disease (COPD): No


Hx Cancer: No


Hx Cardiac Disorders: Yes (Francesco parkinson Syndrome)


Hx Congestive Heart Failure: No


Hx Hypertension: No


Hx Hypercholesterolemia: No


Hx Pacemaker: No


HX Cerebrovascular Accident: No


Hx Seizures: No


Hx Dementia: No


Hx Diabetes: No


Hx Gastrointestinal Disorders: No


Hx Genitourinary Disorders: No


Hx Sexually Transmitted Disorders: No


Hx Renal Disease (ESRD): No


Hx Thyroid Disease: No


Hx Human Immunodeficiency Virus (HIV): No


Hx Hepatitis C: No


Hx Depression: No


Hx Suicide Attempt: No (Denies suicidal ideation at this time)


Hx Bipolar Disorder: No


Hx Schizophrenia: No





- Patient Surgical History


Past Surgical History: No


Hx Neurologic Surgery: No


Hx Cataract Extraction: No


Hx Cardiac Surgery: No


Hx Lung Surgery: No


Hx Breast Surgery: No


Hx Breast Biopsy: No


Hx Abdominal Surgery: No


Hx Appendectomy: No


Hx Cholecystectomy: No


Hx Genitourinary Surgery: No


Hx  Section: No


Hx Orthopedic Surgery: No


Anesthesia Reaction: No





- PPD History


Previous Implant?: Yes


Documented Results: Negative w/o proof


Implanted On Prior [facility] Admission?: Yes


Date: 19


Results: AMA


PPD to be Administered?: Yes





- Smoking Cessation


Smoking history: Current every day smoker


Have you smoked in the past 12 months: Yes


Aproximately how many cigarettes per day: 20


Cigars Per Day: 0


Hx Chewing Tobacco Use: No


Initiated information on smoking cessation: Yes


'Breaking Loose' booklet given: 19





- Substance & Tx. History


Hx Alcohol Use: Yes


Hx Substance Use: Yes


Substance Use Type: Alcohol, Cocaine


Hx Substance Use Treatment: Yes ([facility])





- Substances Abused


  ** VODKA


Route: Oral


Frequency: Daily


Amount used: 3 PINTS


Age of first use: 26


Date of Last Use: 19 (4 AM)





  ** THC


Route: Smoking


Frequency: Daily


Amount used: 4 BLUNTS


Age of first use: 19


Date of Last Use: 19





Family Disease History





- Family Disease History


Family History: Denies





Admission Physical Exam [facility]





- Vital Signs


Vital Signs: 


 Vital Signs - 24 hr











  19





  19:57


 


Temperature 97.4 F L


 


Pulse Rate 85


 


Respiratory 18





Rate 


 


Blood Pressure 140/85














- Physical


General Appearance: Yes: Appropriately Dressed, Mild Distress, Irritable, 

Sweating, Anxious


HEENTM: Yes: EOMI, Normocephalic, Normal Voice, SIS, Pharynx Normal, Nasal 

Congestion


Respiratory: Yes: Chest Non-Tender, Lungs Clear, Normal Breath Sounds, No 

Respiratory Distress, No Accessory Muscle Use


Neck: Yes: No masses,lesions,Nodules, Supple, Trachea in good position


Breast: Yes: Breast Exam Deferred


Cardiology: Yes: Regular Rhythm, Regular Rate, S1, S2


Abdominal: Yes: Non Tender, Soft, Increased Bowel Sounds, Protuberent


Genitourinary: Yes: Other (NO C/O)


Back: Yes: Normal Inspection


Musculoskeletal: Yes: full range of Motion, Gait Steady


Extremities: Yes: Normal Capillary Refill, Non-Tender


Neurological: Yes: Fully Oriented, Alert, Motor Strength 5/5, Depressed Affect


Integumentary: Yes: Warm, Moist, Other (LEFT FOREARM WITH SCARS FROM OLD SLAH 

MARKS)


Lymphatic: Yes: Within Normal Limits





- Diagnostic


(1) Cocaine abuse, uncomplicated


Current Visit: Yes   Status: Acute   





(2) Cannabis abuse, uncomplicated


Current Visit: Yes   Status: Acute   





(3) At risk for dehydration due to poor fluid intake


Current Visit: Yes   Status: Acute   





(4) Substance induced mood disorder


Current Visit: Yes   Status: Acute   





(5) Alcohol dependence with uncomplicated withdrawal


Current Visit: No   Status: Acute   





(6) History of Francesco-Parkinson-White (WPW) syndrome


Current Visit: No   Status: Chronic   





(7) Nicotine dependence


Current Visit: No   Status: Chronic   


Qualifiers: 


   Nicotine product type: cigarettes   Substance use status: uncomplicated   

Qualified Code(s): F17.210 - Nicotine dependence, cigarettes, uncomplicated   





Cleared for Admission [facility]





- Detox or Rehab


[facility] Level of Care: Medically Managed


Detox Regimen/Protocol: Librium


Claeared for Rehab Admission: No





S Breath Alcohol Content


Breath Alcohol Content: 0.113





Urine Drug Screen





- Results


Drug Screen Negative: No


Urine Drug Screen Results: THC-Marijuana, BZO-Benzodiazepines





Inpatient Rehab Admission





- Rehab Decision to Admit


Inpatient rehab admission?: No

## 2019-03-19 LAB
APPEARANCE UR: CLEAR
BILIRUB UR STRIP.AUTO-MCNC: NEGATIVE MG/DL
COLOR UR: (no result)
KETONES UR QL STRIP: NEGATIVE
LEUKOCYTE ESTERASE UR QL STRIP.AUTO: NEGATIVE
NITRITE UR QL STRIP: NEGATIVE
PH UR: 7 [PH] (ref 5–8)
PROT UR QL STRIP: NEGATIVE
PROT UR QL STRIP: NEGATIVE
SP GR UR: 1.01 (ref 1.01–1.03)
UROBILINOGEN UR STRIP-MCNC: NEGATIVE MG/DL (ref 0.2–1)

## 2019-03-19 PROCEDURE — HZ2ZZZZ DETOXIFICATION SERVICES FOR SUBSTANCE ABUSE TREATMENT: ICD-10-PCS | Performed by: SURGERY

## 2019-03-19 NOTE — PN
S CIWA





- CIWA Score


Nausea/Vomitin-No Nausea/No Vomiting


Muscle Tremors: 1-None Visible, but Felt


Anxiety: 1-Mildly Anxious


Agitation: 1-Slight > Activity


Paroxysmal Sweats: 1-Minimal Palms Moist


Orientation: 1-Uncertain about Date


Tacttile Disturbances: 0-None


Auditory Disturbances: 0-None


Visual Disturbances: 0-None


Headache: 1-Very Mild


CIWA-Ar Total Score: 6





BHS Progress Note (SOAP)


Subjective: 





long history of hypertension 


non adherence


begin lisinopril 5 mg po daily 


Objective: 





19 15:38


 Vital Signs











Temperature  97.7 F   19 13:32


 


Pulse Rate  65   19 13:32


 


Respiratory Rate  18   19 13:32


 


Blood Pressure  125/79   19 13:32


 


O2 Sat by Pulse Oximetry (%)      








readmission last admission a week ago


19 15:39





Assessment: 





19 15:40


mild withdrawal sx


Plan: 





continue detox

## 2019-03-19 NOTE — CONSULT
[facility] Psychiatric Consult





- Data


Date of interview: 03/19/19


Admission source: [facility]


Identifying data: Readmission to [facility] for this 39 y/o [place]-born male 

self-referred for detoxification (alcohol, cannabis). Interviewed on [address]. 

Patient is single (common-law), a father of four, domiciled and currently 

employed.


Substance Abuse History: Confirmed by the patient in this interview. Details in 

current [facility] as follows : Smoking history: Current every day smoker.  Have you 

smoked in the past 12 months: Yes.  Aproximately how many cigarettes per day: 

20.  Cigars Per Day: 0.  Hx Chewing Tobacco Use: No.  Initiated information on 

smoking cessation: Yes.  'Breaking Loose' booklet given: 03/18/19.  - Substance 

& Tx. History.  Hx Alcohol Use: Yes.  Hx Substance Use: Yes.  Substance Use Type

: Alcohol, Cocaine.  Hx Substance Use Treatment: Yes ([facility]).  - Substances 

Abused.  ** VODKA.  Route: Oral.  Frequency: Daily.  Amount used: 3 PINTS.  Age 

of first use: 26.  Date of Last Use: 03/18/19 (4 AM).  ** THC.  Route: Smoking.

  Frequency: Daily.  Amount used: 4 BLUNTS.  Age of first use: 19.  Date of 

Last Use: 03/17/19


Medical History: History of Francesco-Parkinson-White syndrome (WPW).


Psychiatric History: Patient denies.


Physical/Sexual Abuse/Trauma History: Patient denies.


Additional Comment: Urine Drug Screen Results: THC-Marijuana, BZO-

Benzodiazepines. Noted.





Mental Status Exam





- Mental Status Exam


Alert and Oriented to: Time, Place, Person


Cognitive Function: Good


Mood: Hopeful, Euthymic


Affect: Appropriate, Normal Range


Patient Behavior: Fatigued, Cooperative


Speech Pattern: Clear, Appropriate (bilingual = creole + english)


Voice Loudness: Normal


Thought Process: Intact, Goal Oriented


Thought Disorder: Not Present


Hallucinations: Denies


Suicidal Ideation: Denies


Homicidal Ideation: Denies


Insight/Judgement: Fair


Sleep: Well


Appetite: Good


Muscle strength/Tone: Normal


Gait/Station: Normal





Psychiatric Findings





- Problem List (Axis 1, 2,3)


(1) Alcohol dependence with uncomplicated withdrawal


Current Visit: Yes   Status: Acute   





(2) Cannabis dependence


Current Visit: Yes   Status: Chronic   





(3) Nicotine dependence


Current Visit: Yes   Status: Chronic   


Qualifiers: 


   Nicotine product type: cigarettes   Substance use status: uncomplicated   

Qualified Code(s): F17.210 - Nicotine dependence, cigarettes, uncomplicated   





- Initial Treatment Plan


Initial Treatment Plan: Psychoeducation. Sleep hygiene. AA meetings. 

Motivational sessions. Groups. Support. Observation.

## 2019-03-20 VITALS — DIASTOLIC BLOOD PRESSURE: 78 MMHG | SYSTOLIC BLOOD PRESSURE: 122 MMHG | TEMPERATURE: 96.4 F | HEART RATE: 56 BPM

## 2019-03-20 NOTE — DS
[facility] Detox Discharge Summary


Admission Date: 


03/19/19





Discharge Date: 03/20/19





- History


Present History: Alcohol Dependence, Cannabis Dependence, Cocaine Dependence


Additional Comments: 





CLIENT REQUESTED TO SIGN OUT. DID NOT WANT TO SPEAK WITH PROVIDER. STATED " 

JUST SIGN MY DC PAPERS THAT ALL I WANT FROM YOU" HE THEN PROCEEDED TO WALK AWAY 

FROM PROVIDER.


HE IS A/O X3 NAD AMBULATING W/O DIFFICULTY


 Vital Signs











Temperature  96.4 F L  03/20/19 06:49


 


Pulse Rate  56 L  03/20/19 06:49


 


Respiratory Rate  18   03/20/19 06:49


 


Blood Pressure  122/78   03/20/19 06:49


 


O2 Sat by Pulse Oximetry (%)      











Pertinent Past History: 


NICOTINE DEP


WPW SYNDROME





- Physical Exam Results


Vital Signs: 


 Vital Signs











Temperature  96.4 F L  03/20/19 06:49


 


Pulse Rate  56 L  03/20/19 06:49


 


Respiratory Rate  18   03/20/19 06:49


 


Blood Pressure  122/78   03/20/19 06:49


 


O2 Sat by Pulse Oximetry (%)      











Pertinent Admission Physical Exam Findings: 


WITHDRAWAL SX'S





 Laboratory Tests











  03/19/19 03/19/19 03/20/19





  13:30 16:53 05:52


 


POC Glucometer   103  93


 


Urine Color  Ltyellow  


 


Urine Appearance  Clear  


 


Urine pH  7.0  


 


Ur Specific [place]  1.011  


 


Urine Protein  Negative  


 


Urine Glucose (UA)  Negative  


 


Urine Ketones  Negative  


 


Urine Blood  Negative  


 


Urine Nitrite  Negative  


 


Urine Bilirubin  Negative  


 


Urine Urobilinogen  Negative  


 


Ur Leukocyte Esterase  Negative  








 








- Treatment


Hospital Course: Discharged Condition Good


Patient has Accepted a Rehab Referral to: REFUSED





- Medication


Discharge Medications: 


Ambulatory Orders





Aspirin [ASA -] 325 mg PO DAILY 01/06/19 


Insulin Lispro [Humalog] 0 unit SQ BID 03/02/19 











- Diagnosis


(1) Cocaine abuse, uncomplicated


Status: Acute   





(2) Cannabis abuse, uncomplicated


Status: Acute   





(3) At risk for dehydration due to poor fluid intake


Status: Acute   





(4) Substance induced mood disorder


Status: Acute   





(5) Alcohol dependence with uncomplicated withdrawal


Status: Acute   





(6) History of Francesco-Parkinson-White (WPW) syndrome


Status: Chronic   





(7) Nicotine dependence


Status: Chronic   


Qualifiers: 


   Nicotine product type: cigarettes   Substance use status: uncomplicated   

Qualified Code(s): F17.210 - Nicotine dependence, cigarettes, uncomplicated   





- AMA


Did Patient Leave Against Medical Advice: Yes

## 2019-04-06 ENCOUNTER — HOSPITAL ENCOUNTER (INPATIENT)
Dept: HOSPITAL 74 - YASAS | Age: 39
LOS: 1 days | Discharge: LEFT BEFORE DISCHARGE | DRG: 770 | End: 2019-04-07
Attending: SURGERY | Admitting: SURGERY
Payer: COMMERCIAL

## 2019-04-06 VITALS — BODY MASS INDEX: 22.1 KG/M2

## 2019-04-06 DIAGNOSIS — E11.9: ICD-10-CM

## 2019-04-06 DIAGNOSIS — F17.213: ICD-10-CM

## 2019-04-06 DIAGNOSIS — I45.6: ICD-10-CM

## 2019-04-06 DIAGNOSIS — F10.230: Primary | ICD-10-CM

## 2019-04-06 DIAGNOSIS — F12.10: ICD-10-CM

## 2019-04-06 DIAGNOSIS — Z91.89: ICD-10-CM

## 2019-04-06 PROCEDURE — HZ2ZZZZ DETOXIFICATION SERVICES FOR SUBSTANCE ABUSE TREATMENT: ICD-10-PCS | Performed by: SURGERY

## 2019-04-06 NOTE — HP
CIWA Score


Nausea/Vomitin-No Nausea/No Vomiting


Muscle Tremors: None


Anxiety: 1-Mildly Anxious


Agitation: 5


Paroxysmal Sweats: 4-Forehead w/Sweat Beads


Orientation: 2-Disoriented Date<2 days


Tacttile Disturbances: 0-None


Auditory Disturbances: 0-None


Visual Disturbances: 0-None


Headache: 5-Severe


CIWA-Ar Total Score: 17





- Admission Criteria


OAS Guidelines: Admission for Medically Managed Detox: 


Requires at least one of the followin. CIWA greater than 12


2. Seizures within the past 24 hours


3. Delirium tremens within the past 24 hours


4. Hallucinations within the past 24 hours


5. Acute intervention needed for co  occurring medical disorder


6. Acute intervention needed for co  occurring psychiatric disorder


7. Severe withdrawal that cannot be handled at a lower level of care (continued


    vomiting, continued diarrhea, abnormal vital signs) requiring intravenous


    medication and/or fluids


8. Pregnancy





Patient presents the following: CIWA greater than 12


Admission Criteria Met: Admission criteria met





Admission ROS [facility]





- [facility]


Chief Complaint: 





c/o worsening withdrawal sx's . seeking detox


Allergies/Adverse Reactions: 


 Allergies











Allergy/AdvReac Type Severity Reaction Status Date / Time


 


No Known Allergies Allergy   Verified 19 19:52











History of Present Illness: 





38 y.o. male with hx/o alcoholism here for detox. client is known to this 

program. self referred. he has signed out ama 3x in the past 90 days. This is 

client 3 admission in the past 30 days. present intoxicated mercedes 0.290 with ciwa 

of 17. Denies any clean time. Presently irritable and non complaint with 

interview.


Exam Limitations: No Limitations





- Ebola screening


Have you traveled outside of the country in the last 21 days: No (N)


Have you had contact with anyone from an Ebola affected area: No


Do you have a fever: No





- Review of Systems


Constitutional: Chills, Loss of Appetite, Malaise, Night Sweats, Changes in 

sleep, Unexplained wgt Loss


EENT: reports: No Symptoms Reported


Respiratory: reports: Shortness of Breath


Cardiac: reports: No Symptoms Reported


GI: reports: Nausea, Poor Appetite, Poor Fluid Intake, Vomiting


: reports: No Symptoms Reported


Musculoskeletal: reports: No Symptoms Reported


Integumentary: reports: Sweating


Neuro: reports: No Symptoms reported


Endocrine: reports: No Symptoms Reported


Hematology: reports: No Symptoms Reported


Psychiatric: reports: Agitated (irritable), Depressed


Other Systems: Reviewed and Negative





Patient History





- Patient Medical History


Hx Anemia: No


Hx Asthma: No


Hx Chronic Obstructive Pulmonary Disease (COPD): No


Hx Cancer: No


Hx Cardiac Disorders: Yes (Francesco parkinson Syndrome)


Hx Congestive Heart Failure: No


Hx Hypertension: No


Hx Hypercholesterolemia: No


Hx Pacemaker: No


HX Cerebrovascular Accident: No


Hx Seizures: No


Hx Dementia: No


Hx Diabetes: No


Hx Gastrointestinal Disorders: No


Hx Genitourinary Disorders: No


Hx Sexually Transmitted Disorders: No


Hx Renal Disease (ESRD): No


Hx Thyroid Disease: No


Hx Human Immunodeficiency Virus (HIV): No


Hx Hepatitis C: No


Hx Depression: No


Hx Suicide Attempt: No


Hx Bipolar Disorder: No


Hx Schizophrenia: No





- Patient Surgical History


Past Surgical History: No


Hx Neurologic Surgery: No


Hx Cataract Extraction: No


Hx Cardiac Surgery: No


Hx Lung Surgery: No


Hx Breast Surgery: No


Hx Breast Biopsy: No


Hx Abdominal Surgery: No


Hx Appendectomy: No


Hx Cholecystectomy: No


Hx Genitourinary Surgery: No


Hx  Section: No


Hx Orthopedic Surgery: No


Anesthesia Reaction: No





- PPD History


Previous Implant?: Yes


Documented Results: Negative w/o proof


Implanted On Prior R Admission?: Yes


Date: 19


Results: AMA


PPD to be Administered?: Yes





- Smoking Cessation


Smoking history: Current every day smoker


Have you smoked in the past 12 months: Yes


Aproximately how many cigarettes per day: 20


Cigars Per Day: 0


Hx Chewing Tobacco Use: No


Initiated information on smoking cessation: Yes


'Breaking Loose' booklet given: 19





- Substance & Tx. History


Hx Alcohol Use: Yes


Hx Substance Use: Yes


Substance Use Type: Alcohol, Marijuana


Hx Substance Use Treatment: Yes ([facility])





- Substances abused


  ** Alcohol


Other (specify): VODKA


Substance route: Oral


Frequency: Daily


Amount used: 4 PINTS VODKA


Age of first use: 17


Date of last use: 19





  ** Marijuana/Hashish


Substance route: Smoking


Frequency: Daily


Amount used: 2 BAgs


Age of first use: 17


Date of last use: 19





Family Disease History





- Family Disease History


Family History: Denies





Admission Physical Exam [facility]





- Physical


General Appearance: Yes: Alcohol on Breath, Irritable (EXTREMELY), Sweating


HEENTM: Yes: EOMI, Normocephalic, Normal Voice, SIS, Pharynx Normal, Other (

POOR DENTITION)


Respiratory: Yes: Chest Non-Tender, Lungs Clear, Normal Breath Sounds, No 

Respiratory Distress, No Accessory Muscle Use


Neck: Yes: No masses,lesions,Nodules, Supple, Trachea in good position


Breast: Yes: Breast Exam Deferred


Cardiology: Yes: Regular Rhythm, Regular Rate, S1, S2


Abdominal: Yes: Normal Bowel Sounds, Non Tender, Soft


Genitourinary: Yes: Within Normal Limits (NO C/O)


Back: Yes: Normal Inspection


Musculoskeletal: Yes: full range of Motion, Gait Steady


Extremities: Yes: Normal Capillary Refill, Non-Tender, Other (LEFT SIDED 

WEAKNESS)


Neurological: Yes: Alert, Depressed Affect


Integumentary: Yes: Warm, Diaphoresis


Lymphatic: Yes: Within Normal Limits





- Diagnostic


(1) Acute alcohol intoxication


Current Visit: Yes   Status: Acute   


Qualifiers: 


   Complication of substance-induced condition: uncomplicated   Qualified Code(s

): F10.920 - Alcohol use, unspecified with intoxication, uncomplicated   





(2) Alcohol dependence with uncomplicated withdrawal


Current Visit: Yes   Status: Acute   





(3) At risk for dehydration due to poor fluid intake


Current Visit: Yes   Status: Acute   





(4) Cannabis abuse, uncomplicated


Current Visit: Yes   Status: Acute   





(5) Substance induced mood disorder


Current Visit: Yes   Status: Acute   





(6) History of Francesco-Parkinson-White (WPW) syndrome


Current Visit: Yes   Status: Chronic   





(7) Nicotine dependence


Current Visit: Yes   Status: Chronic   


Qualifiers: 


   Nicotine product type: cigarettes   Substance use status: uncomplicated   

Qualified Code(s): F17.210 - Nicotine dependence, cigarettes, uncomplicated   





Cleared for Admission [facility]





- Detox or Rehab


[facility] Level of Care: Medically Managed


Detox Regimen/Protocol: Librium


Claeared for Rehab Admission: No





Inpatient Rehab Admission





- Rehab Decision to Admit


Inpatient rehab admission?: No

## 2019-04-07 VITALS — TEMPERATURE: 98 F | SYSTOLIC BLOOD PRESSURE: 128 MMHG | DIASTOLIC BLOOD PRESSURE: 73 MMHG

## 2019-04-07 VITALS — HEART RATE: 68 BPM

## 2019-04-07 NOTE — DS
[facility] Detox Discharge Summary


Admission Date: 


04/06/19





Discharge Date: 04/07/19





- History


Present History: Alcohol Dependence


Additional Comments: 





38 years old male admitted on 04/06/19 for alcohol withdrawal stabilization


feeling better insists to leave with 360B and 367B


patient is alert no acute distress denies suicidal ideation


Pertinent Past History: 





encourage to attend community self help group





- Physical Exam Results


Vital Signs: 


 Vital Signs











Temperature  98 F   04/07/19 09:52


 


Pulse Rate  88   04/07/19 11:30


 


Respiratory Rate  20   04/07/19 09:52


 


Blood Pressure  128/73   04/07/19 09:52


 


O2 Sat by Pulse Oximetry (%)      











Pertinent Admission Physical Exam Findings: 





alcohol withdrawal sx





- Treatment


Hospital Course: Detox Protocol Followed, Responded well


Patient has Accepted a Rehab Referral to: community self help group





- Medication


Discharge Medications: 


Ambulatory Orders





Aspirin [ASA -] 325 mg PO DAILY 01/06/19 











- Diagnosis


(1) Alcohol dependence with uncomplicated withdrawal


Current Visit: Yes   Status: Acute   





(2) Nicotine dependence


Current Visit: Yes   Status: Acute   


Qualifiers: 


   Nicotine product type: cigarettes   Substance use status: in withdrawal   

Qualified Code(s): F17.213 - Nicotine dependence, cigarettes, with withdrawal   





(3) Diabetes mellitus


Current Visit: Yes   Status: Chronic   


Qualifiers: 


   Diabetes mellitus type: type 2   Diabetes mellitus complication status: 

without complication 





- AMA


Did Patient Leave Against Medical Advice: Yes

## 2019-06-07 ENCOUNTER — HOSPITAL ENCOUNTER (INPATIENT)
Dept: HOSPITAL 74 - YASAS | Age: 39
LOS: 1 days | Discharge: LEFT BEFORE DISCHARGE | DRG: 770 | End: 2019-06-08
Attending: SURGERY | Admitting: SURGERY
Payer: COMMERCIAL

## 2019-06-07 VITALS — BODY MASS INDEX: 22.1 KG/M2

## 2019-06-07 DIAGNOSIS — F12.20: ICD-10-CM

## 2019-06-07 DIAGNOSIS — F17.210: ICD-10-CM

## 2019-06-07 DIAGNOSIS — F14.10: ICD-10-CM

## 2019-06-07 DIAGNOSIS — E11.9: ICD-10-CM

## 2019-06-07 DIAGNOSIS — Z86.79: ICD-10-CM

## 2019-06-07 DIAGNOSIS — I45.6: ICD-10-CM

## 2019-06-07 DIAGNOSIS — F10.230: Primary | ICD-10-CM

## 2019-06-07 DIAGNOSIS — Z91.5: ICD-10-CM

## 2019-06-07 PROCEDURE — HZ2ZZZZ DETOXIFICATION SERVICES FOR SUBSTANCE ABUSE TREATMENT: ICD-10-PCS | Performed by: SURGERY
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
## 2019-06-08 VITALS — TEMPERATURE: 98.3 F | DIASTOLIC BLOOD PRESSURE: 78 MMHG | HEART RATE: 64 BPM | SYSTOLIC BLOOD PRESSURE: 129 MMHG

## 2019-06-08 LAB
ALBUMIN SERPL-MCNC: 3.6 G/DL (ref 3.4–5)
ALP SERPL-CCNC: 89 U/L (ref 45–117)
ALT SERPL-CCNC: 28 U/L (ref 13–61)
ANION GAP SERPL CALC-SCNC: 6 MMOL/L (ref 8–16)
AST SERPL-CCNC: 34 U/L (ref 15–37)
BILIRUB SERPL-MCNC: 0.3 MG/DL (ref 0.2–1)
BUN SERPL-MCNC: 13 MG/DL (ref 7–18)
CALCIUM SERPL-MCNC: 8.5 MG/DL (ref 8.5–10.1)
CHLORIDE SERPL-SCNC: 107 MMOL/L (ref 98–107)
CO2 SERPL-SCNC: 28 MMOL/L (ref 21–32)
CREAT SERPL-MCNC: 0.9 MG/DL (ref 0.55–1.3)
DEPRECATED RDW RBC AUTO: 13.8 % (ref 11.9–15.9)
GLUCOSE SERPL-MCNC: 67 MG/DL (ref 74–106)
HCT VFR BLD CALC: 39.6 % (ref 35.4–49)
HGB BLD-MCNC: 13.2 GM/DL (ref 11.7–16.9)
MCH RBC QN AUTO: 35.7 PG (ref 25.7–33.7)
MCHC RBC AUTO-ENTMCNC: 33.5 G/DL (ref 32–35.9)
MCV RBC: 106.7 FL (ref 80–96)
PLATELET # BLD AUTO: 154 K/MM3 (ref 134–434)
PMV BLD: 8.2 FL (ref 7.5–11.1)
POTASSIUM SERPLBLD-SCNC: 4 MMOL/L (ref 3.5–5.1)
PROT SERPL-MCNC: 7 G/DL (ref 6.4–8.2)
RBC # BLD AUTO: 3.71 M/MM3 (ref 4–5.6)
SODIUM SERPL-SCNC: 141 MMOL/L (ref 136–145)
WBC # BLD AUTO: 2.9 K/MM3 (ref 4–10)

## 2019-06-08 NOTE — DS
[facility] Detox Discharge Summary


Admission Date: 


06/07/19





Discharge Date: 06/08/19 (Pt left AMA)





- History


Present History: Alcohol Dependence


Additional Comments: 





Pt left AMA. Pt did not complete his detox protocol. Pt stated he has to leave 

because he has to take care of something. Attempt to let pt stay and complete 

his detox protocol failed. Pt is encouraged to follow-up with his PMD and also 

follow-up with [facility] outpatient program. Pt is AOx3, in no respiratory distress.


Pertinent Past History: 





H/O cocaine, cannabis, and alcohol use disorder.





- Physical Exam Results


Vital Signs: 


 Vital Signs











Temperature  98.3 F   06/08/19 09:34


 


Pulse Rate  64   06/08/19 09:34


 


Respiratory Rate  20   06/08/19 09:34


 


Blood Pressure  129/78   06/08/19 09:34


 


O2 Sat by Pulse Oximetry (%)      








 Laboratory Last Values











WBC  2.9 K/mm3 (4.0-10.0)  L  06/08/19  08:00    


 


RBC  3.71 M/mm3 (4.00-5.60)  L  06/08/19  08:00    


 


Hgb  13.2 GM/dL (11.7-16.9)   06/08/19  08:00    


 


Hct  39.6 % (35.4-49)   06/08/19  08:00    


 


MCV  106.7 fl (80-96)  H  06/08/19  08:00    


 


MCH  35.7 pg (25.7-33.7)  H  06/08/19  08:00    


 


MCHC  33.5 g/dl (32.0-35.9)   06/08/19  08:00    


 


RDW  13.8 % (11.9-15.9)   06/08/19  08:00    


 


MPV  8.2 fl (7.5-11.1)   06/08/19  08:00    


 


Sodium  141 mmol/L (136-145)   06/08/19  08:00    


 


Potassium  4.0 mmol/L (3.5-5.1)   06/08/19  08:00    


 


Chloride  107 mmol/L ()   06/08/19  08:00    


 


Carbon Dioxide  28 mmol/L (21-32)   06/08/19  08:00    


 


Anion Gap  6 MMOL/L (8-16)  L  06/08/19  08:00    


 


BUN  13.0 mg/dL (7-18)   06/08/19  08:00    


 


Creatinine  0.9 mg/dL (0.55-1.3)   06/08/19  08:00    


 


Est GFR (CKD-EPI)AfAm  126.02   06/08/19  08:00    


 


Est GFR (CKD-EPI)NonAf  108.73   06/08/19  08:00    


 


Random Glucose  67 mg/dL ()  L  06/08/19  08:00    


 


Calcium  8.5 mg/dL (8.5-10.1)   06/08/19  08:00    


 


Total Bilirubin  0.3 mg/dL (0.2-1)   06/08/19  08:00    


 


AST  34 U/L (15-37)   06/08/19  08:00    


 


ALT  28 U/L (13-61)   06/08/19  08:00    


 


Alkaline Phosphatase  89 U/L ()   06/08/19  08:00    


 


Total Protein  7.0 g/dl (6.4-8.2)   06/08/19  08:00    


 


Albumin  3.6 g/dl (3.4-5.0)   06/08/19  08:00    


 


RPR Titer  Nonreactive  (NONREACTIVE)   06/08/19  08:00    








Labs noted.


Pertinent Admission Physical Exam Findings: 





Withdrawal symptoms.





- Treatment


Hospital Course: Detox Protocol Followed





- Medication


Discharge Medications: 


Ambulatory Orders





Aspirin [ASA -] 325 mg PO DAILY 01/06/19 











- Diagnosis


(1) Alcohol dependence with uncomplicated withdrawal


Status: Acute   





(2) Cannabis abuse, uncomplicated


Status: Acute   





(3) Cocaine abuse, uncomplicated


Status: Acute   





(4) Diabetes mellitus


Status: Chronic   


Qualifiers: 


   Diabetes mellitus type: type 2   Diabetes mellitus complication status: 

without complication 





(5) History of Francesco-Parkinson-White (WPW) syndrome


Status: Chronic   





(6) Nicotine dependence


Status: Chronic   


Qualifiers: 


   Nicotine product type: cigarettes   Substance use status: in withdrawal   

Qualified Code(s): F17.213 - Nicotine dependence, cigarettes, with withdrawal   





- AMA


Did Patient Leave Against Medical Advice: Yes

## 2019-07-05 ENCOUNTER — HOSPITAL ENCOUNTER (INPATIENT)
Dept: HOSPITAL 74 - YASAS | Age: 39
LOS: 1 days | Discharge: HOME | DRG: 773 | End: 2019-07-06
Attending: SURGERY | Admitting: SURGERY
Payer: COMMERCIAL

## 2019-07-05 VITALS — BODY MASS INDEX: 23 KG/M2

## 2019-07-05 DIAGNOSIS — F14.10: ICD-10-CM

## 2019-07-05 DIAGNOSIS — Z79.84: ICD-10-CM

## 2019-07-05 DIAGNOSIS — E11.9: ICD-10-CM

## 2019-07-05 DIAGNOSIS — Z86.79: ICD-10-CM

## 2019-07-05 DIAGNOSIS — F11.23: ICD-10-CM

## 2019-07-05 DIAGNOSIS — F12.20: ICD-10-CM

## 2019-07-05 DIAGNOSIS — F17.210: ICD-10-CM

## 2019-07-05 DIAGNOSIS — F10.230: Primary | ICD-10-CM

## 2019-07-05 PROCEDURE — HZ2ZZZZ DETOXIFICATION SERVICES FOR SUBSTANCE ABUSE TREATMENT: ICD-10-PCS | Performed by: SURGERY
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
## 2019-07-06 VITALS — HEART RATE: 60 BPM | TEMPERATURE: 97.7 F | DIASTOLIC BLOOD PRESSURE: 85 MMHG | SYSTOLIC BLOOD PRESSURE: 132 MMHG

## 2019-07-06 LAB
ALBUMIN SERPL-MCNC: 3.2 G/DL (ref 3.4–5)
ALP SERPL-CCNC: 71 U/L (ref 45–117)
ALT SERPL-CCNC: 25 U/L (ref 13–61)
ANION GAP SERPL CALC-SCNC: 5 MMOL/L (ref 8–16)
AST SERPL-CCNC: 24 U/L (ref 15–37)
BILIRUB SERPL-MCNC: 0.4 MG/DL (ref 0.2–1)
BUN SERPL-MCNC: 11.3 MG/DL (ref 7–18)
CALCIUM SERPL-MCNC: 8.3 MG/DL (ref 8.5–10.1)
CHLORIDE SERPL-SCNC: 107 MMOL/L (ref 98–107)
CO2 SERPL-SCNC: 28 MMOL/L (ref 21–32)
CREAT SERPL-MCNC: 0.9 MG/DL (ref 0.55–1.3)
DEPRECATED RDW RBC AUTO: 13.7 % (ref 11.9–15.9)
GLUCOSE SERPL-MCNC: 84 MG/DL (ref 74–106)
HCT VFR BLD CALC: 39 % (ref 35.4–49)
HGB BLD-MCNC: 12.8 GM/DL (ref 11.7–16.9)
MCH RBC QN AUTO: 35 PG (ref 25.7–33.7)
MCHC RBC AUTO-ENTMCNC: 32.9 G/DL (ref 32–35.9)
MCV RBC: 106.4 FL (ref 80–96)
PLATELET # BLD AUTO: 192 K/MM3 (ref 134–434)
PMV BLD: 8.3 FL (ref 7.5–11.1)
POTASSIUM SERPLBLD-SCNC: 3.9 MMOL/L (ref 3.5–5.1)
PROT SERPL-MCNC: 6.3 G/DL (ref 6.4–8.2)
RBC # BLD AUTO: 3.67 M/MM3 (ref 4–5.6)
SODIUM SERPL-SCNC: 140 MMOL/L (ref 136–145)
WBC # BLD AUTO: 5.1 K/MM3 (ref 4–10)

## 2019-07-06 NOTE — DS
[facility] Detox Discharge Summary


Admission Date: 


07/05/19





Discharge Date: 07/06/19 (Left AMA)





- History


Present History: Alcohol Dependence, Cannabis Dependence, Opioid Dependence


Additional Comments: 





Pt left AMA. Did not complete his detox protocol. As per RN pt stated he wants 

to leave and does not want to follow-up with his detox protocol and an attempt 

to let him stay and complete his detox protocol failed. Pt did not wait to talk 

to the provider. When provide met him this morning pt c/o nausea, interrupted 

sleep, anxiety, and mild headache. Pt is alert and oriented x3 and in no 

respiratory distress when he was seen during morning rounds.


Pertinent Past History: 





h/o alcohol, cannabis, and heroine use disorder.





- Physical Exam Results


Vital Signs: 


 Vital Signs











Temperature  97.7 F   07/06/19 09:27


 


Pulse Rate  60   07/06/19 09:27


 


Respiratory Rate  16   07/06/19 09:27


 


Blood Pressure  132/85   07/06/19 09:27


 


O2 Sat by Pulse Oximetry (%)      








 Vital Signs











  07/06/19 07/06/19





  07:48 09:27


 


Temperature 97 F L 97.7 F


 


Pulse Rate 54 L 60


 


Respiratory 18 16





Rate  


 


Blood Pressure 115/74 132/85








 Lab Results











WBC  5.1 K/mm3 (4.0-10.0)   07/06/19  08:00    


 


RBC  3.67 M/mm3 (4.00-5.60)  L  07/06/19  08:00    


 


Hgb  12.8 GM/dL (11.7-16.9)   07/06/19  08:00    


 


Hct  39.0 % (35.4-49)   07/06/19  08:00    


 


MCV  106.4 fl (80-96)  H  07/06/19  08:00    


 


MCHC  32.9 g/dl (32.0-35.9)   07/06/19  08:00    


 


RDW  13.7 % (11.9-15.9)   07/06/19  08:00    


 


Plt Count  192 K/MM3 (134-434)  D 07/06/19  08:00    


 


Sodium  140 mmol/L (136-145)   07/06/19  08:00    


 


Potassium  3.9 mmol/L (3.5-5.1)   07/06/19  08:00    


 


Chloride  107 mmol/L ()   07/06/19  08:00    


 


Carbon Dioxide  28 mmol/L (21-32)   07/06/19  08:00    


 


Anion Gap  5 MMOL/L (8-16)  L  07/06/19  08:00    


 


BUN  11.3 mg/dL (7-18)   07/06/19  08:00    


 


Creatinine  0.9 mg/dL (0.55-1.3)   07/06/19  08:00    


 


Random Glucose  84 mg/dL ()   07/06/19  08:00    


 


Calcium  8.3 mg/dL (8.5-10.1)  L  07/06/19  08:00    








Labs reviewed.


Pertinent Admission Physical Exam Findings: 





withdrawal symptoms.





- Treatment


Hospital Course: Detox Protocol Followed





- Medication


Discharge Medications: 


Ambulatory Orders





NK [No Known Home Medication]  07/05/19 











- Diagnosis


(1) Acute alcohol intoxication


Status: Acute   


Qualifiers: 


   Complication of substance-induced condition: uncomplicated   Qualified Code(s

): F10.920 - Alcohol use, unspecified with intoxication, uncomplicated   





(2) Alcohol dependence with uncomplicated withdrawal


Status: Acute   





(3) Cannabis abuse, uncomplicated


Status: Acute   





(4) Cocaine abuse, uncomplicated


Status: Acute   





(5) Opiate dependence


Status: Acute   





(6) Cannabis dependence


Status: Chronic   





(7) Diabetes mellitus


Status: Chronic   


Qualifiers: 


   Diabetes mellitus type: type 2   Diabetes mellitus complication status: 

without complication 





(8) History of Francesco-Parkinson-White (WPW) syndrome


Status: Chronic   





(9) Nicotine dependence


Status: Chronic   


Qualifiers: 


   Nicotine product type: cigarettes   Substance use status: in withdrawal   

Qualified Code(s): F17.213 - Nicotine dependence, cigarettes, with withdrawal   





- AMA


Did Patient Leave Against Medical Advice: Yes

## 2019-07-07 NOTE — EKG
Test Reason : 

Blood Pressure : ***/*** mmHG

Vent. Rate : 070 BPM     Atrial Rate : 070 BPM

   P-R Int : 124 ms          QRS Dur : 088 ms

    QT Int : 394 ms       P-R-T Axes : 058 063 055 degrees

   QTc Int : 425 ms

 

NORMAL SINUS RHYTHM

NORMAL ECG

WHEN COMPARED WITH ECG OF 02-MAR-2019 21:37,

VENT. RATE HAS DECREASED BY  45 BPM

T WAVE AMPLITUDE HAS INCREASED IN ANTEROLATERAL LEADS

Confirmed by MD BIANCA, ALTAGRACIA ([id number]) on 7/7/2019 2:00:03 PM

 

Referred By:             Confirmed By:ALTAGRACIA VALENZUELA MD

## 2019-08-10 ENCOUNTER — HOSPITAL ENCOUNTER (INPATIENT)
Dept: HOSPITAL 74 - YASAS | Age: 39
LOS: 1 days | Discharge: LEFT BEFORE DISCHARGE | DRG: 770 | End: 2019-08-11
Attending: SURGERY | Admitting: SURGERY
Payer: COMMERCIAL

## 2019-08-10 VITALS — BODY MASS INDEX: 23.3 KG/M2

## 2019-08-10 DIAGNOSIS — E11.9: ICD-10-CM

## 2019-08-10 DIAGNOSIS — F12.10: ICD-10-CM

## 2019-08-10 DIAGNOSIS — F17.213: ICD-10-CM

## 2019-08-10 DIAGNOSIS — F10.230: Primary | ICD-10-CM

## 2019-08-10 DIAGNOSIS — F13.230: ICD-10-CM

## 2019-08-10 DIAGNOSIS — F11.10: ICD-10-CM

## 2019-08-10 DIAGNOSIS — R00.0: ICD-10-CM

## 2019-08-10 PROCEDURE — HZ2ZZZZ DETOXIFICATION SERVICES FOR SUBSTANCE ABUSE TREATMENT: ICD-10-PCS | Performed by: ALLERGY & IMMUNOLOGY
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
## 2019-08-11 VITALS — SYSTOLIC BLOOD PRESSURE: 111 MMHG | HEART RATE: 72 BPM | TEMPERATURE: 97.4 F | DIASTOLIC BLOOD PRESSURE: 79 MMHG

## 2019-08-11 LAB
ALBUMIN SERPL-MCNC: 3.6 G/DL (ref 3.4–5)
ALP SERPL-CCNC: 74 U/L (ref 45–117)
ALT SERPL-CCNC: 46 U/L (ref 13–61)
ANION GAP SERPL CALC-SCNC: 6 MMOL/L (ref 8–16)
AST SERPL-CCNC: 45 U/L (ref 15–37)
BILIRUB SERPL-MCNC: 0.5 MG/DL (ref 0.2–1)
BUN SERPL-MCNC: 9.4 MG/DL (ref 7–18)
CALCIUM SERPL-MCNC: 9.1 MG/DL (ref 8.5–10.1)
CHLORIDE SERPL-SCNC: 108 MMOL/L (ref 98–107)
CO2 SERPL-SCNC: 29 MMOL/L (ref 21–32)
CREAT SERPL-MCNC: 0.9 MG/DL (ref 0.55–1.3)
DEPRECATED RDW RBC AUTO: 13.4 % (ref 11.9–15.9)
GLUCOSE SERPL-MCNC: 81 MG/DL (ref 74–106)
HCT VFR BLD CALC: 36.3 % (ref 35.4–49)
HGB BLD-MCNC: 12.3 GM/DL (ref 11.7–16.9)
MCH RBC QN AUTO: 35.3 PG (ref 25.7–33.7)
MCHC RBC AUTO-ENTMCNC: 33.8 G/DL (ref 32–35.9)
MCV RBC: 104.7 FL (ref 80–96)
PLATELET # BLD AUTO: 199 K/MM3 (ref 134–434)
PMV BLD: 8.1 FL (ref 7.5–11.1)
POTASSIUM SERPLBLD-SCNC: 4.1 MMOL/L (ref 3.5–5.1)
PROT SERPL-MCNC: 6.8 G/DL (ref 6.4–8.2)
RBC # BLD AUTO: 3.47 M/MM3 (ref 4–5.6)
SODIUM SERPL-SCNC: 142 MMOL/L (ref 136–145)
WBC # BLD AUTO: 5.5 K/MM3 (ref 4–10)

## 2019-08-11 NOTE — DS
[facility] Detox Discharge Summary


Admission Date: 


08/10/19





Discharge Date: 08/11/19





- History


Present History: Alcohol Dependence, Sedative Dependence


Additional Comments: 





38 years old male admitted on 08/10/19 for acute alcohol and benzo withdrawal 

sx management


insists to leave the detox unit due to "emergency" situation at home 


patient refuses to elaborate condition regarding emergency


patient is alert oriented x 3 speech clearly steady gait 


patient stands for goal directed action to leave the detox 


strong recommend the patient seek community support toward sobriety





- Physical Exam Results


Vital Signs: 


 Vital Signs











Temperature  97.4 F L  08/11/19 06:30


 


Pulse Rate  72   08/11/19 06:30


 


Respiratory Rate  18   08/11/19 06:30


 


Blood Pressure  111/79   08/11/19 06:30


 


O2 Sat by Pulse Oximetry (%)      











Pertinent Admission Physical Exam Findings: 





alcohol and benzo withdrawal sx


 Vital Signs











Temperature  97.4 F L  08/11/19 06:30


 


Pulse Rate  72   08/11/19 06:30


 


Respiratory Rate  18   08/11/19 06:30


 


Blood Pressure  111/79   08/11/19 06:30


 


O2 Sat by Pulse Oximetry (%)      








 Laboratory Last Values











WBC  5.5 K/mm3 (4.0-10.0)   08/11/19  08:00    


 


RBC  3.47 M/mm3 (4.00-5.60)  L  08/11/19  08:00    


 


Hgb  12.3 GM/dL (11.7-16.9)   08/11/19  08:00    


 


Hct  36.3 % (35.4-49)   08/11/19  08:00    


 


MCV  104.7 fl (80-96)  H  08/11/19  08:00    


 


MCH  35.3 pg (25.7-33.7)  H  08/11/19  08:00    


 


MCHC  33.8 g/dl (32.0-35.9)   08/11/19  08:00    


 


RDW  13.4 % (11.9-15.9)   08/11/19  08:00    


 


Plt Count  199 K/MM3 (134-434)   08/11/19  08:00    


 


MPV  8.1 fl (7.5-11.1)   08/11/19  08:00    


 


Sodium  142 mmol/L (136-145)   08/11/19  08:00    


 


Potassium  4.1 mmol/L (3.5-5.1)   08/11/19  08:00    


 


Chloride  108 mmol/L ()  H  08/11/19  08:00    


 


Carbon Dioxide  29 mmol/L (21-32)   08/11/19  08:00    


 


Anion Gap  6 MMOL/L (8-16)  L  08/11/19  08:00    


 


BUN  9.4 mg/dL (7-18)   08/11/19  08:00    


 


Creatinine  0.9 mg/dL (0.55-1.3)   08/11/19  08:00    


 


Est GFR (CKD-EPI)AfAm  126.02   08/11/19  08:00    


 


Est GFR (CKD-EPI)NonAf  108.73   08/11/19  08:00    


 


Random Glucose  81 mg/dL ()   08/11/19  08:00    


 


Calcium  9.1 mg/dL (8.5-10.1)   08/11/19  08:00    


 


Total Bilirubin  0.5 mg/dL (0.2-1)   08/11/19  08:00    


 


AST  45 U/L (15-37)  H  08/11/19  08:00    


 


ALT  46 U/L (13-61)   08/11/19  08:00    


 


Alkaline Phosphatase  74 U/L ()   08/11/19  08:00    


 


Total Protein  6.8 g/dl (6.4-8.2)   08/11/19  08:00    


 


Albumin  3.6 g/dl (3.4-5.0)   08/11/19  08:00    








lab noted 


ast elevation most likely alcohol related





- Treatment


Hospital Course: Detox Protocol Followed, Responded well


Patient has Accepted a Rehab Referral to: community support approach





- Medication


Discharge Medications: 


Ambulatory Orders





NK [No Known Home Medication]  08/10/19 











- Diagnosis


(1) Alcohol dependence with uncomplicated withdrawal


Status: Acute   





(2) Nicotine dependence


Status: Acute   


Qualifiers: 


   Nicotine product type: cigarettes   Substance use status: in withdrawal   

Qualified Code(s): F17.213 - Nicotine dependence, cigarettes, with withdrawal   





(3) Diabetes mellitus


Status: Chronic   


Qualifiers: 


   Diabetes mellitus type: type 2   Diabetes mellitus complication status: 

without complication 





(4) Substance induced mood disorder


Status: Suspected   





(5) Sedative, hypnotic or anxiolytic dependence with withdrawal, uncomplicated


Status: Acute   





- AMA


Did Patient Leave Against Medical Advice: Yes

## 2019-10-04 ENCOUNTER — HOSPITAL ENCOUNTER (INPATIENT)
Dept: HOSPITAL 74 - YASAS | Age: 39
LOS: 1 days | Discharge: LEFT BEFORE DISCHARGE | DRG: 770 | End: 2019-10-05
Attending: ALLERGY & IMMUNOLOGY | Admitting: ALLERGY & IMMUNOLOGY
Payer: COMMERCIAL

## 2019-10-04 VITALS — BODY MASS INDEX: 23.1 KG/M2

## 2019-10-04 DIAGNOSIS — F17.210: ICD-10-CM

## 2019-10-04 DIAGNOSIS — F10.230: Primary | ICD-10-CM

## 2019-10-04 DIAGNOSIS — R01.1: ICD-10-CM

## 2019-10-04 DIAGNOSIS — Z87.19: ICD-10-CM

## 2019-10-04 DIAGNOSIS — F11.21: ICD-10-CM

## 2019-10-04 DIAGNOSIS — Z91.89: ICD-10-CM

## 2019-10-04 DIAGNOSIS — Z86.79: ICD-10-CM

## 2019-10-04 DIAGNOSIS — F10.220: ICD-10-CM

## 2019-10-04 DIAGNOSIS — F13.20: ICD-10-CM

## 2019-10-04 DIAGNOSIS — F12.20: ICD-10-CM

## 2019-10-04 DIAGNOSIS — H55.00: ICD-10-CM

## 2019-10-04 PROCEDURE — HZ2ZZZZ DETOXIFICATION SERVICES FOR SUBSTANCE ABUSE TREATMENT: ICD-10-PCS | Performed by: ALLERGY & IMMUNOLOGY

## 2019-10-04 RX ADMIN — DOCUSATE SODIUM SCH MG: 100 CAPSULE, LIQUID FILLED ORAL at 22:38
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
## 2019-10-05 VITALS — SYSTOLIC BLOOD PRESSURE: 119 MMHG | HEART RATE: 80 BPM | TEMPERATURE: 97.1 F | DIASTOLIC BLOOD PRESSURE: 77 MMHG

## 2019-10-05 LAB
ALBUMIN SERPL-MCNC: 3.2 G/DL (ref 3.4–5)
ALP SERPL-CCNC: 89 U/L (ref 45–117)
ALT SERPL-CCNC: 38 U/L (ref 13–61)
ANION GAP SERPL CALC-SCNC: 8 MMOL/L (ref 8–16)
AST SERPL-CCNC: 48 U/L (ref 15–37)
BILIRUB SERPL-MCNC: 0.2 MG/DL (ref 0.2–1)
BUN SERPL-MCNC: 15.4 MG/DL (ref 7–18)
CALCIUM SERPL-MCNC: 8.4 MG/DL (ref 8.5–10.1)
CHLORIDE SERPL-SCNC: 107 MMOL/L (ref 98–107)
CO2 SERPL-SCNC: 26 MMOL/L (ref 21–32)
CREAT SERPL-MCNC: 0.9 MG/DL (ref 0.55–1.3)
DEPRECATED RDW RBC AUTO: 13.1 % (ref 11.9–15.9)
GLUCOSE SERPL-MCNC: 90 MG/DL (ref 74–106)
HCT VFR BLD CALC: 35.5 % (ref 35.4–49)
HGB BLD-MCNC: 11.8 GM/DL (ref 11.7–16.9)
MCH RBC QN AUTO: 34.1 PG (ref 25.7–33.7)
MCHC RBC AUTO-ENTMCNC: 33.1 G/DL (ref 32–35.9)
MCV RBC: 103.1 FL (ref 80–96)
PLATELET # BLD AUTO: 152 K/MM3 (ref 134–434)
PMV BLD: 8.3 FL (ref 7.5–11.1)
POTASSIUM SERPLBLD-SCNC: 3.4 MMOL/L (ref 3.5–5.1)
PROT SERPL-MCNC: 6.2 G/DL (ref 6.4–8.2)
RBC # BLD AUTO: 3.45 M/MM3 (ref 4–5.6)
SODIUM SERPL-SCNC: 141 MMOL/L (ref 136–145)
WBC # BLD AUTO: 4.1 K/MM3 (ref 4–10)

## 2019-10-05 RX ADMIN — DOCUSATE SODIUM SCH MG: 100 CAPSULE, LIQUID FILLED ORAL at 05:25

## 2019-10-05 NOTE — DS
[facility] Detox Discharge Summary


Admission Date: 


10/04/19





Discharge Date: 10/05/19





- History


Present History: Alcohol Dependence, Cannabis Dependence, Opioid Dependence


Additional Comments: 





DESPITE EFFORTS BY NP AND BY NURSING STAFF TO ADDRESS PATIENT'S MEDICAL NEEDS / 

CONCERNS, PATIENT DOES NOT WISH TO REMAIN TO COMPLETE DETOX REGIMEN. RISKS OF 

LEAVING DETOX UNIT AGAINST MEDICAL ADVICE AND PRIOR TO COMPLETION OF DETOX 

REGIMEN EXPLAINED TO PATIENT. PATIENT ADVISED TO GO IMMEDIATELY TO NEAREST ER 

SHOULD ANY INTOLERABLE WITHDRAWAL / DETOX SYMPTOMS DEVELOP AT ANY TIME. PATIENT 

VERBALIZED UNDERSTANDING OF ALL INFORMATION / RECOMMENDATIONS PRESENTED TO HIM 

PRIOR TO DEPARTURE FROM DETOX UNIT. PATIENT LEFT DETOX UNIT IN STABLE MEDICAL 

CONDITION.


Pertinent Past History: 





Nystagmus, History Of Cardiac Murmur, History Of Umbilical Hernia, Hypokalemia, 

Elevated AST Level, History Of Francesco-Parkinson-White Syndrome (WPS).





- Physical Exam Results


Vital Signs: 


 Vital Signs











Temperature  97.1 F L  10/05/19 09:15


 


Pulse Rate  80   10/05/19 09:15


 


Respiratory Rate  18   10/05/19 09:15


 


Blood Pressure  119/77   10/05/19 09:15


 


O2 Sat by Pulse Oximetry (%)      











Pertinent Admission Physical Exam Findings: 





WITHDRAWAL SYMPTOMS.





 Laboratory Tests











  10/05/19 10/05/19





  08:10 08:10


 


WBC  4.1 


 


RBC  3.45 L 


 


Hgb  11.8 


 


Hct  35.5 


 


MCV  103.1 H 


 


MCH  34.1 H 


 


MCHC  33.1 


 


RDW  13.1 


 


Plt Count  152  D 


 


MPV  8.3 


 


Sodium   141


 


Potassium   3.4 L


 


Chloride   107


 


Carbon Dioxide   26


 


Anion Gap   8


 


BUN   15.4


 


Creatinine   0.9


 


Est GFR (CKD-EPI)AfAm   125.13


 


Est GFR (CKD-EPI)NonAf   107.97


 


Random Glucose   90


 


Calcium   8.4 L


 


Total Bilirubin   0.2


 


AST   48 H


 


ALT   38


 


Alkaline Phosphatase   89


 


Total Protein   6.2 L


 


Albumin   3.2 L








LABS NOTED.





- Medication


Discharge Medications: 


Ambulatory Orders





NK [No Known Home Medication]  08/10/19 











- Diagnosis


(1) Acute alcohol intoxication


Status: Acute   


Qualifiers: 


   Complication of substance-induced condition: uncomplicated   Qualified Code(s

): F10.920 - Alcohol use, unspecified with intoxication, uncomplicated   





(2) Alcohol dependence with uncomplicated withdrawal


Status: Acute   





(3) At risk for dehydration due to poor fluid intake


Status: Acute   





(4) Heroin use disorder, mild, in early remission


Status: Acute   





(5) Unspecified nystagmus


Status: Acute   





(6) Cannabis abuse, uncomplicated


Status: Chronic   





(7) Cardiac murmur


Status: Chronic   





(8) History of umbilical hernia


Status: Chronic   





(9) History of Francesco-Parkinson-White (WPW) syndrome


Status: Suspected   





- AMA


Did Patient Leave Against Medical Advice: Yes (PATIENT DID NOT WISH TO REMAIN 

TO COMPLETE DETOX REGIMEN.)





[facility] CIWA





- CIWA Score


Nausea/Vomitin-No Nausea/No Vomiting


Muscle Tremors: 3


Anxiety: 4-Mod. Anxious/Guarded


Agitation: 3


Paroxysmal Sweats: No Perspiration


Orientation: 0-Oriented


Tacttile Disturbances: 1-Very Mild Itch/Numbness


Auditory Disturbances: 2-Mild Harshness/Frighten


Visual Disturbances: 0-None


Headache: 0-None Present


CIWA-Ar Total Score: 13

## 2019-11-02 ENCOUNTER — HOSPITAL ENCOUNTER (INPATIENT)
Dept: HOSPITAL 74 - YASAS | Age: 39
LOS: 1 days | Discharge: LEFT BEFORE DISCHARGE | DRG: 770 | End: 2019-11-03
Attending: ALLERGY & IMMUNOLOGY | Admitting: ALLERGY & IMMUNOLOGY
Payer: COMMERCIAL

## 2019-11-02 VITALS — BODY MASS INDEX: 22.6 KG/M2

## 2019-11-02 DIAGNOSIS — F10.220: ICD-10-CM

## 2019-11-02 DIAGNOSIS — F13.230: ICD-10-CM

## 2019-11-02 DIAGNOSIS — F17.210: ICD-10-CM

## 2019-11-02 DIAGNOSIS — F11.10: ICD-10-CM

## 2019-11-02 DIAGNOSIS — H55.00: ICD-10-CM

## 2019-11-02 DIAGNOSIS — F10.230: Primary | ICD-10-CM

## 2019-11-02 DIAGNOSIS — R77.0: ICD-10-CM

## 2019-11-02 DIAGNOSIS — R63.8: ICD-10-CM

## 2019-11-02 DIAGNOSIS — F12.10: ICD-10-CM

## 2019-11-02 DIAGNOSIS — I45.6: ICD-10-CM

## 2019-11-02 PROCEDURE — HZ2ZZZZ DETOXIFICATION SERVICES FOR SUBSTANCE ABUSE TREATMENT: ICD-10-PCS | Performed by: ALLERGY & IMMUNOLOGY
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
## 2019-11-03 VITALS — HEART RATE: 75 BPM | SYSTOLIC BLOOD PRESSURE: 103 MMHG | DIASTOLIC BLOOD PRESSURE: 57 MMHG | TEMPERATURE: 97.7 F

## 2019-11-03 LAB
ALBUMIN SERPL-MCNC: 3.3 G/DL (ref 3.4–5)
ALP SERPL-CCNC: 87 U/L (ref 45–117)
ALT SERPL-CCNC: 37 U/L (ref 13–61)
ANION GAP SERPL CALC-SCNC: 5 MMOL/L (ref 8–16)
AST SERPL-CCNC: 51 U/L (ref 15–37)
BILIRUB SERPL-MCNC: 0.2 MG/DL (ref 0.2–1)
BUN SERPL-MCNC: 9.1 MG/DL (ref 7–18)
CALCIUM SERPL-MCNC: 8.5 MG/DL (ref 8.5–10.1)
CHLORIDE SERPL-SCNC: 107 MMOL/L (ref 98–107)
CO2 SERPL-SCNC: 29 MMOL/L (ref 21–32)
CREAT SERPL-MCNC: 1 MG/DL (ref 0.55–1.3)
DEPRECATED RDW RBC AUTO: 14.6 % (ref 11.9–15.9)
GLUCOSE SERPL-MCNC: 79 MG/DL (ref 74–106)
HCT VFR BLD CALC: 36.8 % (ref 35.4–49)
HGB BLD-MCNC: 12.8 GM/DL (ref 11.7–16.9)
MCH RBC QN AUTO: 36.5 PG (ref 25.7–33.7)
MCHC RBC AUTO-ENTMCNC: 34.6 G/DL (ref 32–35.9)
MCV RBC: 105.5 FL (ref 80–96)
PLATELET # BLD AUTO: 167 K/MM3 (ref 134–434)
PMV BLD: 8.4 FL (ref 7.5–11.1)
POTASSIUM SERPLBLD-SCNC: 3.8 MMOL/L (ref 3.5–5.1)
PROT SERPL-MCNC: 6.4 G/DL (ref 6.4–8.2)
RBC # BLD AUTO: 3.49 M/MM3 (ref 4–5.6)
SODIUM SERPL-SCNC: 141 MMOL/L (ref 136–145)
WBC # BLD AUTO: 4.7 K/MM3 (ref 4–10)

## 2019-11-03 NOTE — DS
[facility] Detox Discharge Summary


Admission Date: 


11/02/19





Discharge Date: 11/03/19





- History


Present History: Alcohol Dependence, Cannabis Dependence, Cocaine Dependence


Additional Comments: 





Patient demanded to leave AMA despite encouragement to complete detox. Patient 

stated, "I got an emergency." Patient instructed to call 911 ASAP if feeling 

sick or withdrawal sxs and to see his PCP within 3 days in which he verbalized 

understanding.


Pertinent Past History: 





Nicotine dependence





Alcohol dependence





Cannabis dependence





Cocaine dependence





Opioid abuse





Sedative abuse 





History of Francesco parkinson syndrome (WPW)





- Physical Exam Results


Vital Signs: 


 Vital Signs











Temperature  97.7 F   11/03/19 06:03


 


Pulse Rate  75   11/03/19 06:03


 


Respiratory Rate  18   11/03/19 06:03


 


Blood Pressure  103/57 L  11/03/19 06:03


 


O2 Sat by Pulse Oximetry (%)      











Pertinent Admission Physical Exam Findings: 





Withdrawal sxs





 Laboratory Tests











  11/03/19 11/03/19





  07:50 07:50


 


WBC  4.7 


 


RBC  3.49 L 


 


Hgb  12.8 


 


Hct  36.8 


 


MCV  105.5 H 


 


MCH  36.5 H 


 


MCHC  34.6 


 


RDW  14.6  D 


 


Plt Count  167 


 


MPV  8.4 


 


Sodium   141


 


Potassium   3.8


 


Chloride   107


 


Carbon Dioxide   29


 


Anion Gap   5 L


 


BUN   9.1


 


Creatinine   1.0


 


Est GFR (CKD-EPI)AfAm   110.17


 


Est GFR (CKD-EPI)NonAf   95.05


 


Random Glucose   79


 


Calcium   8.5


 


Total Bilirubin   0.2


 


AST   51 H


 


ALT   37


 


Alkaline Phosphatase   87


 


Total Protein   6.4


 


Albumin   3.3 L








Labs reviewed: albumin 3.3 (low), encouraged protein intake





- Medication


Discharge Medications: 


Ambulatory Orders





NK [No Known Home Medication]  08/10/19 











- Diagnosis


(1) Cocaine dependence


Status: Chronic   





(2) Opioid abuse


Status: Acute   





(3) Sedative abuse


Status: Acute   





(4) Alcohol dependence with uncomplicated withdrawal


Status: Acute   





(5) Cannabis abuse, uncomplicated


Status: Chronic   





(6) Cannabis dependence


Status: Chronic   





(7) Nicotine dependence


Status: Chronic   


Qualifiers: 


   Nicotine product type: cigarettes   Substance use status: uncomplicated   

Qualified Code(s): F17.210 - Nicotine dependence, cigarettes, uncomplicated   





(8) History of Francesco-Parkinson-White (WPW) syndrome


Status: Chronic   





(9) Hypoalbuminemia


Status: Acute   





- AMA


Did Patient Leave Against Medical Advice: Yes (Instructed to call 911 if sick/

withdrawal sxs and to see PCP within 3 days)

## 2022-06-20 ENCOUNTER — HOSPITAL ENCOUNTER (INPATIENT)
Dept: HOSPITAL 74 - YASAS | Age: 42
LOS: 2 days | Discharge: LEFT BEFORE DISCHARGE | DRG: 770 | End: 2022-06-22
Attending: SURGERY | Admitting: ALLERGY & IMMUNOLOGY
Payer: COMMERCIAL

## 2022-06-20 VITALS — BODY MASS INDEX: 24.9 KG/M2

## 2022-06-20 DIAGNOSIS — F10.220: ICD-10-CM

## 2022-06-20 DIAGNOSIS — F14.10: ICD-10-CM

## 2022-06-20 DIAGNOSIS — F17.210: ICD-10-CM

## 2022-06-20 DIAGNOSIS — F10.230: Primary | ICD-10-CM

## 2022-06-20 DIAGNOSIS — F12.20: ICD-10-CM

## 2022-06-20 PROCEDURE — HZ2ZZZZ DETOXIFICATION SERVICES FOR SUBSTANCE ABUSE TREATMENT: ICD-10-PCS | Performed by: SURGERY

## 2022-06-21 LAB
ALBUMIN SERPL-MCNC: 3.6 G/DL (ref 3.4–5)
ALP SERPL-CCNC: 106 U/L (ref 45–117)
ALT SERPL-CCNC: 37 U/L (ref 13–61)
ANION GAP SERPL CALC-SCNC: 13 MMOL/L (ref 8–16)
AST SERPL-CCNC: 28 U/L (ref 15–37)
BILIRUB SERPL-MCNC: 0.4 MG/DL (ref 0.2–1)
BUN SERPL-MCNC: 17.1 MG/DL (ref 7–18)
CALCIUM SERPL-MCNC: 8.6 MG/DL (ref 8.5–10.1)
CHLORIDE SERPL-SCNC: 104 MMOL/L (ref 98–107)
CO2 SERPL-SCNC: 25 MMOL/L (ref 21–32)
CREAT SERPL-MCNC: 0.9 MG/DL (ref 0.55–1.3)
DEPRECATED RDW RBC AUTO: 13.2 % (ref 11.9–15.9)
GLUCOSE SERPL-MCNC: 82 MG/DL (ref 74–106)
HCT VFR BLD CALC: 39.4 % (ref 35.4–49)
HGB BLD-MCNC: 13.1 GM/DL (ref 11.7–16.9)
MCH RBC QN AUTO: 33.5 PG (ref 25.7–33.7)
MCHC RBC AUTO-ENTMCNC: 33.3 G/DL (ref 32–35.9)
MCV RBC: 100.5 FL (ref 80–96)
PLATELET # BLD AUTO: 203 10^3/UL (ref 134–434)
PMV BLD: 7.9 FL (ref 7.5–11.1)
PROT SERPL-MCNC: 7.2 G/DL (ref 6.4–8.2)
RBC # BLD AUTO: 3.92 M/MM3 (ref 4–5.6)
SODIUM SERPL-SCNC: 142 MMOL/L (ref 136–145)
WBC # BLD AUTO: 5.9 K/MM3 (ref 4–10)

## 2022-06-21 RX ADMIN — HYDROXYZINE PAMOATE PRN MG: 25 CAPSULE ORAL at 00:32

## 2022-06-21 RX ADMIN — METHOCARBAMOL PRN MG: 500 TABLET ORAL at 10:13

## 2022-06-21 RX ADMIN — NICOTINE PRN MG: 4 INHALANT RESPIRATORY (INHALATION) at 20:16

## 2022-06-21 RX ADMIN — METHOCARBAMOL PRN MG: 500 TABLET ORAL at 00:32

## 2022-06-21 RX ADMIN — Medication SCH TAB: at 10:13

## 2022-06-22 VITALS — DIASTOLIC BLOOD PRESSURE: 95 MMHG | TEMPERATURE: 98.2 F | SYSTOLIC BLOOD PRESSURE: 143 MMHG | HEART RATE: 94 BPM

## 2022-06-22 RX ADMIN — Medication SCH TAB: at 10:26

## 2022-06-22 RX ADMIN — NICOTINE PRN MG: 4 INHALANT RESPIRATORY (INHALATION) at 10:26

## 2022-06-22 RX ADMIN — HYDROXYZINE PAMOATE PRN MG: 25 CAPSULE ORAL at 10:26

## 2022-12-19 NOTE — ED ADULT NURSE NOTE - NS ED NURSE LEVEL OF CONSCIOUSNESS AFFECT
I had called patient a few days ago  The appointment on January 11, 2023 has been rescheduled to December 21, 2022 at 3:40 pm  Please adjust schedule   Thanks
Pt is informed about the message of Prednisone, pt stated she have appt for Next Wednesday and her daughter, I didn't see her on the schedule for 12/21/22 
done
Calm

## 2023-06-23 ENCOUNTER — HOSPITAL ENCOUNTER (INPATIENT)
Dept: HOSPITAL 74 - YASAS | Age: 43
LOS: 4 days | Discharge: HOME | End: 2023-06-27
Attending: SURGERY | Admitting: ALLERGY & IMMUNOLOGY
Payer: COMMERCIAL

## 2023-06-23 VITALS — BODY MASS INDEX: 21.9 KG/M2

## 2023-06-23 DIAGNOSIS — F12.20: ICD-10-CM

## 2023-06-23 DIAGNOSIS — Z20.822: ICD-10-CM

## 2023-06-23 DIAGNOSIS — F10.230: Primary | ICD-10-CM

## 2023-06-23 DIAGNOSIS — F17.210: ICD-10-CM

## 2023-06-23 DIAGNOSIS — E72.20: ICD-10-CM

## 2023-06-23 LAB
ALBUMIN SERPL-MCNC: 4 G/DL (ref 3.4–5)
ALP SERPL-CCNC: 92 U/L (ref 45–117)
ALT SERPL-CCNC: 37 U/L (ref 13–61)
ANION GAP SERPL CALC-SCNC: 12 MMOL/L (ref 8–16)
AST SERPL-CCNC: 34 U/L (ref 15–37)
BILIRUB SERPL-MCNC: 0.3 MG/DL (ref 0.2–1)
BUN SERPL-MCNC: 14.6 MG/DL (ref 7–18)
CALCIUM SERPL-MCNC: 9.6 MG/DL (ref 8.5–10.1)
CHLORIDE SERPL-SCNC: 104 MMOL/L (ref 98–107)
CO2 SERPL-SCNC: 24 MMOL/L (ref 21–32)
CREAT SERPL-MCNC: 0.9 MG/DL (ref 0.55–1.3)
DEPRECATED RDW RBC AUTO: 14 % (ref 11.9–15.9)
GLUCOSE SERPL-MCNC: 108 MG/DL (ref 74–106)
HCT VFR BLD CALC: 37.6 % (ref 35.4–49)
HGB BLD-MCNC: 12.3 GM/DL (ref 11.7–16.9)
MCH RBC QN AUTO: 32.3 PG (ref 25.7–33.7)
MCHC RBC AUTO-ENTMCNC: 32.6 G/DL (ref 32–35.9)
MCV RBC: 99 FL (ref 80–96)
PLATELET # BLD AUTO: 156 10^3/UL (ref 134–434)
PMV BLD: 7.9 FL (ref 7.5–11.1)
POTASSIUM SERPLBLD-SCNC: 3.8 MMOL/L (ref 3.5–5.1)
PROT SERPL-MCNC: 7.9 G/DL (ref 6.4–8.2)
RBC # BLD AUTO: 3.8 M/MM3 (ref 4–5.6)
SODIUM SERPL-SCNC: 140 MMOL/L (ref 136–145)
WBC # BLD AUTO: 5.1 K/MM3 (ref 4–10)

## 2023-06-23 PROCEDURE — HZ2ZZZZ DETOXIFICATION SERVICES FOR SUBSTANCE ABUSE TREATMENT: ICD-10-PCS | Performed by: SURGERY

## 2023-06-23 RX ADMIN — Medication SCH MG: at 22:59

## 2023-06-23 RX ADMIN — NICOTINE SCH: 21 PATCH TRANSDERMAL at 13:01

## 2023-06-24 RX ADMIN — LACTULOSE SCH GM: 20 SOLUTION ORAL at 22:53

## 2023-06-24 RX ADMIN — HYDROXYZINE PAMOATE PRN MG: 25 CAPSULE ORAL at 22:56

## 2023-06-24 RX ADMIN — METHOCARBAMOL PRN MG: 500 TABLET ORAL at 22:56

## 2023-06-24 RX ADMIN — Medication SCH MG: at 22:56

## 2023-06-24 RX ADMIN — NICOTINE SCH: 21 PATCH TRANSDERMAL at 10:01

## 2023-06-24 RX ADMIN — LACTULOSE SCH GM: 20 SOLUTION ORAL at 14:44

## 2023-06-24 RX ADMIN — Medication SCH TAB: at 10:01

## 2023-06-25 RX ADMIN — HYDROXYZINE PAMOATE PRN MG: 25 CAPSULE ORAL at 22:38

## 2023-06-25 RX ADMIN — NICOTINE SCH MG: 21 PATCH TRANSDERMAL at 10:23

## 2023-06-25 RX ADMIN — METHOCARBAMOL PRN MG: 500 TABLET ORAL at 22:38

## 2023-06-25 RX ADMIN — Medication SCH MG: at 22:36

## 2023-06-25 RX ADMIN — LACTULOSE SCH GM: 20 SOLUTION ORAL at 22:38

## 2023-06-25 RX ADMIN — Medication SCH TAB: at 10:23

## 2023-06-25 RX ADMIN — LACTULOSE SCH GM: 20 SOLUTION ORAL at 05:51

## 2023-06-25 RX ADMIN — LACTULOSE SCH GM: 20 SOLUTION ORAL at 13:05

## 2023-06-26 RX ADMIN — LACTULOSE SCH GM: 20 SOLUTION ORAL at 05:47

## 2023-06-26 RX ADMIN — Medication SCH MG: at 22:35

## 2023-06-26 RX ADMIN — NICOTINE SCH MG: 21 PATCH TRANSDERMAL at 09:46

## 2023-06-26 RX ADMIN — Medication SCH TAB: at 09:46

## 2023-06-26 RX ADMIN — LACTULOSE SCH GM: 20 SOLUTION ORAL at 22:35

## 2023-06-26 RX ADMIN — LACTULOSE SCH GM: 20 SOLUTION ORAL at 13:06

## 2023-06-27 VITALS — SYSTOLIC BLOOD PRESSURE: 139 MMHG | DIASTOLIC BLOOD PRESSURE: 92 MMHG | TEMPERATURE: 98.8 F | HEART RATE: 90 BPM

## 2023-06-27 VITALS — RESPIRATION RATE: 16 BRPM

## 2023-06-27 RX ADMIN — NICOTINE SCH: 21 PATCH TRANSDERMAL at 09:57

## 2023-06-27 RX ADMIN — Medication SCH: at 09:57

## 2023-06-27 RX ADMIN — LACTULOSE SCH GM: 20 SOLUTION ORAL at 06:06

## 2025-02-13 NOTE — ED ADULT NURSE NOTE - GENITOURINARY WDL
Time-based billing (NON-critical care) Time-based billing (NON-critical care) Time-based billing (NON-critical care) Time-based billing (NON-critical care) Bladder non-tender and non-distended. Urine clear yellow. Yes